# Patient Record
Sex: FEMALE | Race: WHITE | Employment: OTHER | URBAN - NONMETROPOLITAN AREA
[De-identification: names, ages, dates, MRNs, and addresses within clinical notes are randomized per-mention and may not be internally consistent; named-entity substitution may affect disease eponyms.]

---

## 2017-11-28 ENCOUNTER — APPOINTMENT (OUTPATIENT)
Dept: GENERAL RADIOLOGY | Age: 77
DRG: 536 | End: 2017-11-28
Payer: MEDICARE

## 2017-11-28 ENCOUNTER — APPOINTMENT (OUTPATIENT)
Dept: CT IMAGING | Age: 77
DRG: 536 | End: 2017-11-28
Payer: MEDICARE

## 2017-11-28 ENCOUNTER — HOSPITAL ENCOUNTER (EMERGENCY)
Age: 77
Discharge: HOME | DRG: 536 | End: 2017-11-28
Attending: FAMILY MEDICINE
Payer: MEDICARE

## 2017-11-28 VITALS
SYSTOLIC BLOOD PRESSURE: 178 MMHG | RESPIRATION RATE: 20 BRPM | DIASTOLIC BLOOD PRESSURE: 73 MMHG | HEART RATE: 82 BPM | HEIGHT: 66 IN | WEIGHT: 210 LBS | OXYGEN SATURATION: 98 % | TEMPERATURE: 98.9 F | BODY MASS INDEX: 33.75 KG/M2

## 2017-11-28 DIAGNOSIS — S70.01XA CONTUSION OF RIGHT HIP, INITIAL ENCOUNTER: Primary | ICD-10-CM

## 2017-11-28 LAB
EKG ATRIAL RATE: 73 BPM
EKG P AXIS: 42 DEGREES
EKG P-R INTERVAL: 154 MS
EKG Q-T INTERVAL: 396 MS
EKG QRS DURATION: 72 MS
EKG QTC CALCULATION (BAZETT): 436 MS
EKG R AXIS: 75 DEGREES
EKG T AXIS: 83 DEGREES
EKG VENTRICULAR RATE: 73 BPM

## 2017-11-28 PROCEDURE — 93005 ELECTROCARDIOGRAM TRACING: CPT

## 2017-11-28 PROCEDURE — 72125 CT NECK SPINE W/O DYE: CPT

## 2017-11-28 PROCEDURE — 73552 X-RAY EXAM OF FEMUR 2/>: CPT

## 2017-11-28 PROCEDURE — 6370000000 HC RX 637 (ALT 250 FOR IP): Performed by: FAMILY MEDICINE

## 2017-11-28 PROCEDURE — 72170 X-RAY EXAM OF PELVIS: CPT

## 2017-11-28 PROCEDURE — 99284 EMERGENCY DEPT VISIT MOD MDM: CPT

## 2017-11-28 PROCEDURE — 70450 CT HEAD/BRAIN W/O DYE: CPT

## 2017-11-28 RX ORDER — OXYCODONE HYDROCHLORIDE AND ACETAMINOPHEN 5; 325 MG/1; MG/1
1 TABLET ORAL EVERY 4 HOURS PRN
Qty: 10 TABLET | Refills: 0 | Status: ON HOLD | OUTPATIENT
Start: 2017-11-28 | End: 2017-12-02 | Stop reason: HOSPADM

## 2017-11-28 RX ORDER — OXYCODONE HYDROCHLORIDE AND ACETAMINOPHEN 5; 325 MG/1; MG/1
1 TABLET ORAL ONCE
Status: COMPLETED | OUTPATIENT
Start: 2017-11-28 | End: 2017-11-28

## 2017-11-28 RX ADMIN — OXYCODONE HYDROCHLORIDE AND ACETAMINOPHEN 1 TABLET: 5; 325 TABLET ORAL at 20:44

## 2017-11-28 ASSESSMENT — PAIN SCALES - GENERAL
PAINLEVEL_OUTOF10: 8
PAINLEVEL_OUTOF10: 3

## 2017-11-28 ASSESSMENT — PAIN DESCRIPTION - PROGRESSION: CLINICAL_PROGRESSION: NOT CHANGED

## 2017-11-28 ASSESSMENT — PAIN DESCRIPTION - FREQUENCY: FREQUENCY: INTERMITTENT

## 2017-11-28 ASSESSMENT — PAIN DESCRIPTION - ONSET: ONSET: ON-GOING

## 2017-11-28 ASSESSMENT — PAIN DESCRIPTION - PAIN TYPE: TYPE: ACUTE PAIN

## 2017-11-28 ASSESSMENT — PAIN DESCRIPTION - DESCRIPTORS: DESCRIPTORS: SHARP

## 2017-11-28 ASSESSMENT — PAIN DESCRIPTION - ORIENTATION: ORIENTATION: RIGHT

## 2017-11-28 ASSESSMENT — PAIN DESCRIPTION - LOCATION: LOCATION: HIP

## 2017-11-29 ENCOUNTER — APPOINTMENT (OUTPATIENT)
Dept: CT IMAGING | Age: 77
DRG: 536 | End: 2017-11-29
Payer: MEDICARE

## 2017-11-29 ENCOUNTER — HOSPITAL ENCOUNTER (INPATIENT)
Age: 77
LOS: 3 days | Discharge: NURSING, REHAB OR LONG-TERM CARE | DRG: 536 | End: 2017-12-02
Attending: FAMILY MEDICINE | Admitting: ORTHOPAEDIC SURGERY
Payer: MEDICARE

## 2017-11-29 DIAGNOSIS — S32.501A CLOSED NONDISPLACED FRACTURE OF RIGHT PUBIS, INITIAL ENCOUNTER (HCC): Primary | ICD-10-CM

## 2017-11-29 PROBLEM — S32.810B: Status: ACTIVE | Noted: 2017-11-29

## 2017-11-29 PROBLEM — S32.519B: Status: ACTIVE | Noted: 2017-11-29

## 2017-11-29 LAB
ANION GAP SERPL CALCULATED.3IONS-SCNC: 12 MEQ/L (ref 8–16)
BASOPHILS # BLD: 0.5 %
BASOPHILS ABSOLUTE: 0 THOU/MM3 (ref 0–0.1)
BUN BLDV-MCNC: 18 MG/DL (ref 7–22)
CALCIUM SERPL-MCNC: 9.1 MG/DL (ref 8.5–10.5)
CHLORIDE BLD-SCNC: 100 MEQ/L (ref 98–111)
CO2: 27 MEQ/L (ref 23–33)
CREAT SERPL-MCNC: 0.8 MG/DL (ref 0.4–1.2)
EOSINOPHIL # BLD: 2.2 %
EOSINOPHILS ABSOLUTE: 0.2 THOU/MM3 (ref 0–0.4)
GFR SERPL CREATININE-BSD FRML MDRD: 69 ML/MIN/1.73M2
GLUCOSE BLD-MCNC: 233 MG/DL (ref 70–108)
HCT VFR BLD CALC: 39.3 % (ref 37–47)
HEMOGLOBIN: 12.7 GM/DL (ref 12–16)
LYMPHOCYTES # BLD: 15.1 %
LYMPHOCYTES ABSOLUTE: 1.3 THOU/MM3 (ref 1–4.8)
MCH RBC QN AUTO: 28.6 PG (ref 27–31)
MCHC RBC AUTO-ENTMCNC: 32.4 GM/DL (ref 33–37)
MCV RBC AUTO: 88.2 FL (ref 81–99)
MONOCYTES # BLD: 5.9 %
MONOCYTES ABSOLUTE: 0.5 THOU/MM3 (ref 0.4–1.3)
NUCLEATED RED BLOOD CELLS: 0 /100 WBC
OSMOLALITY CALCULATION: 286.9 MOSMOL/KG (ref 275–300)
PDW BLD-RTO: 13.9 % (ref 11.5–14.5)
PLATELET # BLD: 197 THOU/MM3 (ref 130–400)
PMV BLD AUTO: 9.3 MCM (ref 7.4–10.4)
POTASSIUM SERPL-SCNC: 4.5 MEQ/L (ref 3.5–5.2)
RBC # BLD: 4.46 MILL/MM3 (ref 4.2–5.4)
SEG NEUTROPHILS: 76.3 %
SEGMENTED NEUTROPHILS ABSOLUTE COUNT: 6.6 THOU/MM3 (ref 1.8–7.7)
SODIUM BLD-SCNC: 139 MEQ/L (ref 135–145)
WBC # BLD: 8.6 THOU/MM3 (ref 4.8–10.8)

## 2017-11-29 PROCEDURE — 2580000003 HC RX 258: Performed by: PHYSICIAN ASSISTANT

## 2017-11-29 PROCEDURE — 85025 COMPLETE CBC W/AUTO DIFF WBC: CPT

## 2017-11-29 PROCEDURE — 2580000003 HC RX 258: Performed by: FAMILY MEDICINE

## 2017-11-29 PROCEDURE — 99285 EMERGENCY DEPT VISIT HI MDM: CPT

## 2017-11-29 PROCEDURE — 6820000001 HC L2 TRAUMA SURGERY EVALUATION

## 2017-11-29 PROCEDURE — 74176 CT ABD & PELVIS W/O CONTRAST: CPT

## 2017-11-29 PROCEDURE — 36415 COLL VENOUS BLD VENIPUNCTURE: CPT

## 2017-11-29 PROCEDURE — 1200000000 HC SEMI PRIVATE

## 2017-11-29 PROCEDURE — 6360000002 HC RX W HCPCS: Performed by: FAMILY MEDICINE

## 2017-11-29 PROCEDURE — 80048 BASIC METABOLIC PNL TOTAL CA: CPT

## 2017-11-29 PROCEDURE — 6370000000 HC RX 637 (ALT 250 FOR IP): Performed by: PHYSICIAN ASSISTANT

## 2017-11-29 RX ORDER — SODIUM CHLORIDE 9 MG/ML
INJECTION, SOLUTION INTRAVENOUS CONTINUOUS
Status: DISCONTINUED | OUTPATIENT
Start: 2017-11-29 | End: 2017-11-30

## 2017-11-29 RX ORDER — FENTANYL CITRATE 50 UG/ML
100 INJECTION, SOLUTION INTRAMUSCULAR; INTRAVENOUS
Status: DISCONTINUED | OUTPATIENT
Start: 2017-11-29 | End: 2017-11-29

## 2017-11-29 RX ORDER — 0.9 % SODIUM CHLORIDE 0.9 %
500 INTRAVENOUS SOLUTION INTRAVENOUS ONCE
Status: COMPLETED | OUTPATIENT
Start: 2017-11-29 | End: 2017-11-29

## 2017-11-29 RX ORDER — ONDANSETRON 2 MG/ML
4 INJECTION INTRAMUSCULAR; INTRAVENOUS EVERY 30 MIN PRN
Status: DISCONTINUED | OUTPATIENT
Start: 2017-11-29 | End: 2017-11-29 | Stop reason: ALTCHOICE

## 2017-11-29 RX ORDER — CYCLOBENZAPRINE HCL 10 MG
10 TABLET ORAL 3 TIMES DAILY PRN
Status: DISCONTINUED | OUTPATIENT
Start: 2017-11-29 | End: 2017-12-02 | Stop reason: HOSPADM

## 2017-11-29 RX ORDER — ONDANSETRON 2 MG/ML
4 INJECTION INTRAMUSCULAR; INTRAVENOUS EVERY 6 HOURS PRN
Status: DISCONTINUED | OUTPATIENT
Start: 2017-11-29 | End: 2017-12-02 | Stop reason: HOSPADM

## 2017-11-29 RX ORDER — ESOMEPRAZOLE MAGNESIUM 40 MG/1
40 FOR SUSPENSION ORAL DAILY
COMMUNITY
End: 2022-10-20

## 2017-11-29 RX ORDER — HYDROCODONE BITARTRATE AND ACETAMINOPHEN 5; 325 MG/1; MG/1
2 TABLET ORAL EVERY 4 HOURS PRN
Status: DISCONTINUED | OUTPATIENT
Start: 2017-11-29 | End: 2017-12-02 | Stop reason: HOSPADM

## 2017-11-29 RX ORDER — NALBUPHINE HCL 10 MG/ML
5 AMPUL (ML) INJECTION
Status: DISCONTINUED | OUTPATIENT
Start: 2017-11-29 | End: 2017-11-29 | Stop reason: ALTCHOICE

## 2017-11-29 RX ORDER — 0.9 % SODIUM CHLORIDE 0.9 %
1000 INTRAVENOUS SOLUTION INTRAVENOUS ONCE
Status: COMPLETED | OUTPATIENT
Start: 2017-11-29 | End: 2017-11-29

## 2017-11-29 RX ORDER — HYDROCODONE BITARTRATE AND ACETAMINOPHEN 5; 325 MG/1; MG/1
1 TABLET ORAL EVERY 4 HOURS PRN
Status: DISCONTINUED | OUTPATIENT
Start: 2017-11-29 | End: 2017-12-02 | Stop reason: HOSPADM

## 2017-11-29 RX ADMIN — SODIUM CHLORIDE 1000 ML: 9 INJECTION, SOLUTION INTRAVENOUS at 16:48

## 2017-11-29 RX ADMIN — CYCLOBENZAPRINE HYDROCHLORIDE 10 MG: 10 TABLET, FILM COATED ORAL at 23:15

## 2017-11-29 RX ADMIN — ONDANSETRON 4 MG: 2 INJECTION INTRAMUSCULAR; INTRAVENOUS at 16:48

## 2017-11-29 RX ADMIN — SODIUM CHLORIDE 500 ML: 9 INJECTION, SOLUTION INTRAVENOUS at 16:52

## 2017-11-29 RX ADMIN — HYDROCODONE BITARTRATE AND ACETAMINOPHEN 1 TABLET: 5; 325 TABLET ORAL at 23:15

## 2017-11-29 RX ADMIN — SODIUM CHLORIDE: 9 INJECTION, SOLUTION INTRAVENOUS at 21:31

## 2017-11-29 RX ADMIN — NALBUPHINE HYDROCHLORIDE 5 MG: 10 INJECTION, SOLUTION INTRAMUSCULAR; INTRAVENOUS; SUBCUTANEOUS at 18:37

## 2017-11-29 ASSESSMENT — ENCOUNTER SYMPTOMS
RHINORRHEA: 0
VOMITING: 0
WHEEZING: 0
ABDOMINAL PAIN: 0
SORE THROAT: 0
COUGH: 0
NAUSEA: 0
BACK PAIN: 0
EYE PAIN: 0
EYE DISCHARGE: 0
SHORTNESS OF BREATH: 0
DIARRHEA: 0

## 2017-11-29 ASSESSMENT — PAIN DESCRIPTION - PAIN TYPE
TYPE: ACUTE PAIN
TYPE: ACUTE PAIN

## 2017-11-29 ASSESSMENT — PAIN SCALES - GENERAL
PAINLEVEL_OUTOF10: 4
PAINLEVEL_OUTOF10: 3
PAINLEVEL_OUTOF10: 5
PAINLEVEL_OUTOF10: 6
PAINLEVEL_OUTOF10: 4

## 2017-11-29 NOTE — ED PROVIDER NOTES
and rash. Neurological: Negative for dizziness, syncope, weakness, light-headedness and headaches. Hematological: Negative for adenopathy. PAST MEDICAL HISTORY    has a past medical history of Cancer ([address] History of blood transfusion; Hyperlipidemia; Hypertension; and Type II or unspecified type diabetes mellitus without mention of complication, not stated as uncontrolled. SURGICAL HISTORY      has a past surgical history that includes Gallbladder surgery; Breast surgery; Cholecystectomy; Colonoscopy; Endoscopy, colon, diagnostic; eye surgery; fracture surgery; joint replacement; skin biopsy; Cosmetic surgery; and vascular surgery. CURRENT MEDICATIONS       Discharge Medication List as of 11/28/2017  8:49 PM      CONTINUE these medications which have NOT CHANGED    Details   clonazePAM (KLONOPIN) 0.5 MG tablet Take 1 tablet by mouth 3 times daily as needed for Anxiety, Disp-60 tablet, R-0      venlafaxine (EFFEXOR-XR) 75 MG XR capsule Take 1 capsule by mouth nightly, Disp-30 capsule, R-3      diphenhydrAMINE-zinc acetate 2-0.1 % cream Apply topically 3 times daily as needed. , Disp-1 Tube, R-0, Print      lidocaine (LIDODERM) 5 % Place 1 patch onto the skin daily 12 hours on, 12 hours off., Disp-30 patch, R-0      miconazole (MICOTIN) 2 % powder Apply topically 2 times daily. , Disp-45 g, R-0, Print      mometasone-formoterol (DULERA) 100-5 MCG/ACT inhaler Inhale 2 puffs into the lungs 2 times daily, Disp-1 Inhaler, R-0      aspirin 81 MG EC tablet Take 1 tablet by mouth daily, Disp-30 tablet, R-0      ibuprofen (ADVIL;MOTRIN) 200 MG tablet Take 800 mg by mouth every 4 hours as needed for Pain      metFORMIN (GLUCOPHAGE) 850 MG tablet Take by mouth 425mg (1/2 tab) in morning and 850mg in evening      metoprolol (LOPRESSOR) 50 MG tablet Take 50 mg by mouth daily      doxepin (SINEQUAN) 75 MG capsule Take 75 mg by mouth nightly      omeprazole (PRILOSEC OTC) 20 MG tablet Take 20 mg by mouth every evening      Multiple Vitamins-Minerals (THERAPEUTIC MULTIVITAMIN-MINERALS) tablet Take 1 tablet by mouth daily      Calcium Carbonate-Vitamin D (CALCIUM 600+D PO) Take 1 tablet by mouth daily      Omega-3 Fatty Acids (FISH OIL) 1200 MG CAPS Take 1 capsule by mouth daily      insulin NPH (HUMULIN N;NOVOLIN N) 100 UNIT/ML injection vial Inject 35 Units into the skin nightly      Insulin Lispro, Human, (HUMALOG PEN SC) Inject 3-10 Units into the skin every morning Doses by carb count. Ascorbic Acid (VITAMIN C PO)   Take 1,000 mg by mouth daily       losartan (COZAAR) 50 MG tablet Take 50 mg by mouth nightly       atorvastatin (LIPITOR) 20 MG tablet Take 20 mg by mouth nightly              ALLERGIES     is allergic to levofloxacin; morphine; and bactrim [sulfamethoxazole-trimethoprim]. FAMILY HISTORY     indicated that her mother is . She indicated that her father is . She indicated that her sister is . She indicated that her brother is . family history includes Cancer in her brother, father, and sister; Diabetes in her brother and sister; Heart Disease in her father and mother; Stroke in her father and mother. SOCIAL HISTORY      reports that she quit smoking about 35 years ago. Her smoking use included Cigarettes. She quit after 30.00 years of use. She has never used smokeless tobacco. She reports that she drinks about 1.2 oz of alcohol per week . She reports that she does not use drugs. PHYSICAL EXAM     INITIAL VITALS:  height is 5' 6\" (1.676 m) and weight is 210 lb (95.3 kg). Her oral temperature is 98.9 °F (37.2 °C). Her blood pressure is 178/73 (abnormal) and her pulse is 82. Her respiration is 20 and oxygen saturation is 98%. Physical Exam   Constitutional: She is oriented to person, place, and time. She appears well-developed and well-nourished. HENT:   Head: Normocephalic and atraumatic.    Right Ear: External ear normal.   Left Ear: External ear normal. proximal femur may be the result of prior bone infarct. Phleboliths are present in the pelvis. No fracture or dislocation. Final report electronically signed by Dr. Sharad Edge on 11/28/2017 8:21 PM    Ct Head Wo Contrast    Result Date: 11/28/2017  PROCEDURE: CT HEAD WO CONTRAST CLINICAL INFORMATION: Pain following a fall TECHNIQUE: CT scan of the head was performed from the vertex to the skull base without use of intravenous contrast. All CT scans at this facility use dose modulation, iterative reconstruction, and/or weight-based dosing when appropriate to reduce radiation dose to as low as reasonably achievable. COMPARISON: CT head 6/27/2016 FINDINGS: There is no mass effect, midline shift or acute hemorrhage. Ventricles and sulci are prominent. Diffuse periventricular white matter hypoattenuation is again noted. Cerebral vascular calcifications are present. Visualized orbits, paranasal sinuses and mastoid air cells are unremarkable. 1. No mass effect or acute hemorrhage. 2. Chronic periventricular small vessel ischemic changes and cerebral atrophy. **This report has been created using voice recognition software. It may contain minor errors which are inherent in voice recognition technology. ** Final report electronically signed by Dr. Sharad Edge on 11/28/2017 8:28 PM    Ct Cervical Spine Wo Contrast    Result Date: 11/28/2017  PROCEDURE: CT CERVICAL SPINE WO CONTRAST CLINICAL INFORMATION: Pain following a fall TECHNIQUE: CT of the cervical spine was performed without use of intravenous contrast. Axial images as well as coronal and sagittal reconstructions were obtained. All CT scans at this facility use dose modulation, iterative reconstruction, and/or weight-based dosing when appropriate to reduce radiation dose to as low as reasonably achievable. COMPARISON: CT cervical spine 6/27/2016 FINDINGS: Slight straightening of normal lower cervical lordosis is likely secondary to patient positioning. List as of 11/28/2017  8:49 PM      START taking these medications    Details   oxyCODONE-acetaminophen (PERCOCET) 5-325 MG per tablet Take 1 tablet by mouth every 4 hours as needed for Pain ., Disp-10 tablet, R-0Print             (Please note that portions of this note were completed with a voice recognition program.  Efforts were made to edit the dictations but occasionally words are mis-transcribed.)    Scribe:  Marino Guillen 11/28/17 7:15 PM Scribing for and in the presence of Guerline Gonzalez MD.    Signed by: Marino Guillen (Name), Scribe, 12/06/17 9:58 AM    Provider:  I personally performed the services described in the documentation, reviewed and edited the documentation which was dictated to the scribe in my presence, and it accurately records my words and actions.     Guerline Gonzalez MD 11/28/17 9:58 AM                          Guerline Gonzalez MD  11/28/17 [id number]       Guerline Gonzalez MD  11/29/17 [id number] [facility] Puma Harry MD  12/06/17 3497

## 2017-11-29 NOTE — ED NOTES
Complete pancho care provided w/2 RN assist. Pt able to log roll to either side w/some discomfort. Pt repositioned on cot. Placed in gown. Call light provided.       Andie CLOVER Cohn  11/29/17 3062

## 2017-11-29 NOTE — Clinical Note
Patient Class: Inpatient [101]   REQUIRED: Diagnosis: Fracture of superior rim of unspecified pubis, initial encounter for open fracture [facility] [[id number]]   Estimated Length of Stay: Estimated stay of more than 2 midnights   Future Attending Provider: Bradly Rivers [[id number]]   Admitting Provider: Bradly Rivers [[id number]]   Telemetry Bed Required?: No

## 2017-11-29 NOTE — CARE COORDINATION
Brief ED Social Work Assessment  11/29/17, 4:19 PM    Spoke with patient as requested by the MD due to possible ECF placement. Spoke with patient and she is hopeful that they will admit her. Patient did stated that she has been at [facility] [address] in the past and that she would consider returning there and it was okay to call to see if they have a bed available. Spoke with Robe at [facility] [address] and they have no open beds.

## 2017-11-29 NOTE — ED PROVIDER NOTES
neck pain. Skin: Negative for pallor and rash. Neurological: Negative for dizziness, syncope, weakness, light-headedness and headaches. Hematological: Negative for adenopathy. Psychiatric/Behavioral: Negative for confusion, dysphoric mood and suicidal ideas. The patient is not nervous/anxious. PAST MEDICAL HISTORY    has a past medical history of Cancer ([facility] [address] History of blood transfusion; Hyperlipidemia; Hypertension; and Type II or unspecified type diabetes mellitus without mention of complication, not stated as uncontrolled. SURGICAL HISTORY      has a past surgical history that includes Gallbladder surgery; Breast surgery; Cholecystectomy; Colonoscopy; Endoscopy, colon, diagnostic; eye surgery; fracture surgery; joint replacement; skin biopsy; Cosmetic surgery; and vascular surgery. CURRENT MEDICATIONS       Previous Medications    ASCORBIC ACID (VITAMIN C PO)      Take 1,000 mg by mouth daily     ASPIRIN 81 MG EC TABLET    Take 1 tablet by mouth daily    ATORVASTATIN (LIPITOR) 20 MG TABLET    Take 20 mg by mouth nightly     CALCIUM CARBONATE-VITAMIN D (CALCIUM 600+D PO)    Take 1 tablet by mouth daily    CLONAZEPAM (KLONOPIN) 0.5 MG TABLET    Take 1 tablet by mouth 3 times daily as needed for Anxiety    DIPHENHYDRAMINE-ZINC ACETATE 2-0.1 % CREAM    Apply topically 3 times daily as needed. DOXEPIN (SINEQUAN) 75 MG CAPSULE    Take 75 mg by mouth nightly    IBUPROFEN (ADVIL;MOTRIN) 200 MG TABLET    Take 800 mg by mouth every 4 hours as needed for Pain    INSULIN LISPRO, HUMAN, (HUMALOG PEN SC)    Inject 3-10 Units into the skin every morning Doses by carb count. INSULIN NPH (HUMULIN N;NOVOLIN N) 100 UNIT/ML INJECTION VIAL    Inject 35 Units into the skin nightly    LIDOCAINE (LIDODERM) 5 %    Place 1 patch onto the skin daily 12 hours on, 12 hours off.     LOSARTAN (COZAAR) 50 MG TABLET    Take 50 mg by mouth nightly     METFORMIN (GLUCOPHAGE) 850 MG TABLET    Take by mouth 425mg (1/2 tab) in morning and 850mg in evening    METOPROLOL (LOPRESSOR) 50 MG TABLET    Take 50 mg by mouth daily    MICONAZOLE (MICOTIN) 2 % POWDER    Apply topically 2 times daily. MOMETASONE-FORMOTEROL (DULERA) 100-5 MCG/ACT INHALER    Inhale 2 puffs into the lungs 2 times daily    MULTIPLE VITAMINS-MINERALS (THERAPEUTIC MULTIVITAMIN-MINERALS) TABLET    Take 1 tablet by mouth daily    OMEGA-3 FATTY ACIDS (FISH OIL) 1200 MG CAPS    Take 1 capsule by mouth daily    OMEPRAZOLE (PRILOSEC OTC) 20 MG TABLET    Take 20 mg by mouth every evening    OXYCODONE-ACETAMINOPHEN (PERCOCET) 5-325 MG PER TABLET    Take 1 tablet by mouth every 4 hours as needed for Pain . VENLAFAXINE (EFFEXOR-XR) 75 MG XR CAPSULE    Take 1 capsule by mouth nightly       ALLERGIES     is allergic to levofloxacin; morphine; and bactrim [sulfamethoxazole-trimethoprim]. FAMILY HISTORY     indicated that her mother is . She indicated that her father is . family history includes Heart Disease in her father and mother; Stroke in her father and mother. SOCIAL HISTORY      reports that she quit smoking about 35 years ago. Her smoking use included Cigarettes. She quit after 30.00 years of use. She has never used smokeless tobacco. She reports that she drinks about 1.2 oz of alcohol per week . She reports that she does not use drugs. PHYSICAL EXAM     INITIAL VITALS:  blood pressure is 179/80 (abnormal) and her pulse is 43 (abnormal). Her respiration is 18 and oxygen saturation is 95%. Physical Exam   Constitutional: She is oriented to person, place, and time. She appears well-developed and well-nourished. HENT:   Head: Normocephalic and atraumatic. Right Ear: External ear normal.   Left Ear: External ear normal.   Eyes: Conjunctivae are normal. Right eye exhibits no discharge. Left eye exhibits no discharge. No scleral icterus. Neck: Normal range of motion. Neck supple. No JVD present.    Cardiovascular: Normal rate, regular

## 2017-11-30 LAB
GLUCOSE BLD-MCNC: 156 MG/DL (ref 70–108)
GLUCOSE BLD-MCNC: 169 MG/DL (ref 70–108)
GLUCOSE BLD-MCNC: 178 MG/DL (ref 70–108)
GLUCOSE BLD-MCNC: 219 MG/DL (ref 70–108)
GLUCOSE BLD-MCNC: 228 MG/DL (ref 70–108)

## 2017-11-30 PROCEDURE — 6370000000 HC RX 637 (ALT 250 FOR IP): Performed by: INTERNAL MEDICINE

## 2017-11-30 PROCEDURE — G8979 MOBILITY GOAL STATUS: HCPCS

## 2017-11-30 PROCEDURE — G0008 ADMIN INFLUENZA VIRUS VAC: HCPCS | Performed by: ORTHOPAEDIC SURGERY

## 2017-11-30 PROCEDURE — 97110 THERAPEUTIC EXERCISES: CPT

## 2017-11-30 PROCEDURE — 1200000000 HC SEMI PRIVATE

## 2017-11-30 PROCEDURE — 6370000000 HC RX 637 (ALT 250 FOR IP): Performed by: PHYSICIAN ASSISTANT

## 2017-11-30 PROCEDURE — 6360000002 HC RX W HCPCS: Performed by: PHYSICIAN ASSISTANT

## 2017-11-30 PROCEDURE — 99232 SBSQ HOSP IP/OBS MODERATE 35: CPT | Performed by: INTERNAL MEDICINE

## 2017-11-30 PROCEDURE — G8978 MOBILITY CURRENT STATUS: HCPCS

## 2017-11-30 PROCEDURE — 82948 REAGENT STRIP/BLOOD GLUCOSE: CPT

## 2017-11-30 PROCEDURE — 90686 IIV4 VACC NO PRSV 0.5 ML IM: CPT | Performed by: ORTHOPAEDIC SURGERY

## 2017-11-30 PROCEDURE — 97162 PT EVAL MOD COMPLEX 30 MIN: CPT

## 2017-11-30 PROCEDURE — 6370000000 HC RX 637 (ALT 250 FOR IP): Performed by: ORTHOPAEDIC SURGERY

## 2017-11-30 PROCEDURE — 6360000002 HC RX W HCPCS: Performed by: INTERNAL MEDICINE

## 2017-11-30 PROCEDURE — 6360000002 HC RX W HCPCS: Performed by: ORTHOPAEDIC SURGERY

## 2017-11-30 RX ORDER — VENLAFAXINE HYDROCHLORIDE 75 MG/1
75 CAPSULE, EXTENDED RELEASE ORAL NIGHTLY
Status: DISCONTINUED | OUTPATIENT
Start: 2017-11-30 | End: 2017-12-02 | Stop reason: HOSPADM

## 2017-11-30 RX ORDER — ESOMEPRAZOLE MAGNESIUM 40 MG/1
40 FOR SUSPENSION ORAL DAILY
Status: DISCONTINUED | OUTPATIENT
Start: 2017-11-30 | End: 2017-11-30 | Stop reason: CLARIF

## 2017-11-30 RX ORDER — LOSARTAN POTASSIUM 50 MG/1
50 TABLET ORAL NIGHTLY
Status: DISCONTINUED | OUTPATIENT
Start: 2017-11-30 | End: 2017-12-02 | Stop reason: HOSPADM

## 2017-11-30 RX ORDER — M-VIT,TX,IRON,MINS/CALC/FOLIC 27MG-0.4MG
1 TABLET ORAL DAILY
Status: DISCONTINUED | OUTPATIENT
Start: 2017-11-30 | End: 2017-12-02 | Stop reason: HOSPADM

## 2017-11-30 RX ORDER — METOPROLOL TARTRATE 50 MG/1
50 TABLET, FILM COATED ORAL DAILY
Status: DISCONTINUED | OUTPATIENT
Start: 2017-11-30 | End: 2017-12-02 | Stop reason: HOSPADM

## 2017-11-30 RX ORDER — DEXTROSE MONOHYDRATE 50 MG/ML
100 INJECTION, SOLUTION INTRAVENOUS PRN
Status: DISCONTINUED | OUTPATIENT
Start: 2017-11-30 | End: 2017-12-02 | Stop reason: HOSPADM

## 2017-11-30 RX ORDER — ASCORBIC ACID 500 MG
1000 TABLET ORAL DAILY
Status: DISCONTINUED | OUTPATIENT
Start: 2017-11-30 | End: 2017-12-02 | Stop reason: HOSPADM

## 2017-11-30 RX ORDER — POLYETHYLENE GLYCOL 3350 17 G/17G
17 POWDER, FOR SOLUTION ORAL DAILY
Status: DISCONTINUED | OUTPATIENT
Start: 2017-11-30 | End: 2017-12-02 | Stop reason: HOSPADM

## 2017-11-30 RX ORDER — NICOTINE POLACRILEX 4 MG
15 LOZENGE BUCCAL PRN
Status: DISCONTINUED | OUTPATIENT
Start: 2017-11-30 | End: 2017-12-02 | Stop reason: HOSPADM

## 2017-11-30 RX ORDER — ATORVASTATIN CALCIUM 20 MG/1
20 TABLET, FILM COATED ORAL NIGHTLY
Status: DISCONTINUED | OUTPATIENT
Start: 2017-11-30 | End: 2017-12-02 | Stop reason: HOSPADM

## 2017-11-30 RX ORDER — PANTOPRAZOLE SODIUM 40 MG/1
40 TABLET, DELAYED RELEASE ORAL
Status: DISCONTINUED | OUTPATIENT
Start: 2017-11-30 | End: 2017-12-02 | Stop reason: HOSPADM

## 2017-11-30 RX ORDER — OYSTER SHELL CALCIUM WITH VITAMIN D 500; 200 MG/1; [IU]/1
1 TABLET, FILM COATED ORAL DAILY
Status: DISCONTINUED | OUTPATIENT
Start: 2017-11-30 | End: 2017-12-02 | Stop reason: HOSPADM

## 2017-11-30 RX ORDER — DEXTROSE MONOHYDRATE 25 G/50ML
12.5 INJECTION, SOLUTION INTRAVENOUS PRN
Status: DISCONTINUED | OUTPATIENT
Start: 2017-11-30 | End: 2017-12-02 | Stop reason: HOSPADM

## 2017-11-30 RX ADMIN — METOPROLOL TARTRATE 50 MG: 50 TABLET, FILM COATED ORAL at 20:32

## 2017-11-30 RX ADMIN — HYDROMORPHONE HYDROCHLORIDE 0.5 MG: 1 INJECTION, SOLUTION INTRAMUSCULAR; INTRAVENOUS; SUBCUTANEOUS at 01:43

## 2017-11-30 RX ADMIN — INFLUENZA A VIRUS A/SINGAPORE/GP1908/2015 IVR-180A (H1N1) ANTIGEN (PROPIOLACTONE INACTIVATED), INFLUENZA A VIRUS A/HONG KONG/4801/2014 X-263B (H3N2) ANTIGEN (PROPIOLACTONE INACTIVATED), INFLUENZA B VIRUS B/BRISBANE/46/2015 ANTIGEN (PROPIOLACTONE INACTIVATED), AND INFLUENZA B VIRUS B/PHUKET/3073/2013 BVR-1B ANTIGEN (PROPIOLACTONE INACTIVATED) 0.5 ML: 15; 15; 15; 15 INJECTION, SUSPENSION INTRAMUSCULAR at 10:24

## 2017-11-30 RX ADMIN — VENLAFAXINE HYDROCHLORIDE 75 MG: 75 CAPSULE, EXTENDED RELEASE ORAL at 02:04

## 2017-11-30 RX ADMIN — HYDROCODONE BITARTRATE AND ACETAMINOPHEN 2 TABLET: 5; 325 TABLET ORAL at 10:16

## 2017-11-30 RX ADMIN — ATORVASTATIN CALCIUM 20 MG: 20 TABLET, FILM COATED ORAL at 20:32

## 2017-11-30 RX ADMIN — LOSARTAN POTASSIUM 50 MG: 50 TABLET, FILM COATED ORAL at 20:32

## 2017-11-30 RX ADMIN — VENLAFAXINE HYDROCHLORIDE 75 MG: 75 CAPSULE, EXTENDED RELEASE ORAL at 20:32

## 2017-11-30 RX ADMIN — MULTIPLE VITAMINS W/ MINERALS TAB 1 TABLET: TAB at 10:15

## 2017-11-30 RX ADMIN — HYDROCODONE BITARTRATE AND ACETAMINOPHEN 2 TABLET: 5; 325 TABLET ORAL at 19:58

## 2017-11-30 RX ADMIN — METFORMIN HYDROCHLORIDE 850 MG: 850 TABLET, FILM COATED ORAL at 20:32

## 2017-11-30 RX ADMIN — CYCLOBENZAPRINE HYDROCHLORIDE 10 MG: 10 TABLET, FILM COATED ORAL at 10:16

## 2017-11-30 RX ADMIN — DOXEPIN HYDROCHLORIDE 75 MG: 50 CAPSULE ORAL at 02:04

## 2017-11-30 RX ADMIN — DOXEPIN HYDROCHLORIDE 75 MG: 50 CAPSULE ORAL at 22:32

## 2017-11-30 RX ADMIN — Medication 3 UNITS: at 10:25

## 2017-11-30 RX ADMIN — HYDROCODONE BITARTRATE AND ACETAMINOPHEN 1 TABLET: 5; 325 TABLET ORAL at 03:29

## 2017-11-30 RX ADMIN — METFORMIN HYDROCHLORIDE 425 MG: 850 TABLET, FILM COATED ORAL at 10:16

## 2017-11-30 RX ADMIN — POLYETHYLENE GLYCOL 3350 17 G: 17 POWDER, FOR SOLUTION ORAL at 20:32

## 2017-11-30 RX ADMIN — Medication 1000 MG: at 10:15

## 2017-11-30 RX ADMIN — INSULIN HUMAN 35 UNITS: 100 INJECTION, SUSPENSION SUBCUTANEOUS at 20:38

## 2017-11-30 RX ADMIN — INSULIN LISPRO 1 UNITS: 100 INJECTION, SOLUTION INTRAVENOUS; SUBCUTANEOUS at 20:38

## 2017-11-30 RX ADMIN — CYCLOBENZAPRINE HYDROCHLORIDE 10 MG: 10 TABLET, FILM COATED ORAL at 19:58

## 2017-11-30 RX ADMIN — CALCIUM CARBONATE-VITAMIN D TAB 500 MG-200 UNIT 1 TABLET: 500-200 TAB at 10:16

## 2017-11-30 RX ADMIN — HYDROCODONE BITARTRATE AND ACETAMINOPHEN 2 TABLET: 5; 325 TABLET ORAL at 14:23

## 2017-11-30 RX ADMIN — ENOXAPARIN SODIUM 40 MG: 40 INJECTION SUBCUTANEOUS at 20:31

## 2017-11-30 RX ADMIN — PANTOPRAZOLE SODIUM 40 MG: 40 TABLET, DELAYED RELEASE ORAL at 06:31

## 2017-11-30 ASSESSMENT — PAIN SCALES - GENERAL
PAINLEVEL_OUTOF10: 6
PAINLEVEL_OUTOF10: 8
PAINLEVEL_OUTOF10: 3
PAINLEVEL_OUTOF10: 7
PAINLEVEL_OUTOF10: 4
PAINLEVEL_OUTOF10: 7
PAINLEVEL_OUTOF10: 8
PAINLEVEL_OUTOF10: 7
PAINLEVEL_OUTOF10: 6
PAINLEVEL_OUTOF10: 4
PAINLEVEL_OUTOF10: 7

## 2017-11-30 ASSESSMENT — PAIN DESCRIPTION - PAIN TYPE
TYPE: ACUTE PAIN

## 2017-11-30 ASSESSMENT — PAIN DESCRIPTION - ORIENTATION
ORIENTATION: RIGHT

## 2017-11-30 ASSESSMENT — PAIN DESCRIPTION - ONSET: ONSET: ON-GOING

## 2017-11-30 ASSESSMENT — PAIN DESCRIPTION - LOCATION
LOCATION: HIP;PELVIS
LOCATION: PELVIS

## 2017-11-30 ASSESSMENT — PAIN DESCRIPTION - PROGRESSION: CLINICAL_PROGRESSION: GRADUALLY IMPROVING

## 2017-11-30 ASSESSMENT — PAIN DESCRIPTION - DESCRIPTORS: DESCRIPTORS: ACHING;DULL

## 2017-11-30 ASSESSMENT — PAIN DESCRIPTION - FREQUENCY: FREQUENCY: INTERMITTENT

## 2017-11-30 NOTE — CARE COORDINATION
11/30/17, 3:02 PM    DISCHARGE BARRIERS      [facility] with [facility], no beds are available. Spoke with patient and her . Their second choice is [facility]. Referral made to [facility] for review.

## 2017-11-30 NOTE — H&P
mg at 11/29/17 5507     Current Outpatient Prescriptions   Medication Sig Dispense Refill    oxyCODONE-acetaminophen (PERCOCET) 5-325 MG per tablet Take 1 tablet by mouth every 4 hours as needed for Pain . 10 tablet 0    clonazePAM (KLONOPIN) 0.5 MG tablet Take 1 tablet by mouth 3 times daily as needed for Anxiety 60 tablet 0    venlafaxine (EFFEXOR-XR) 75 MG XR capsule Take 1 capsule by mouth nightly 30 capsule 3    diphenhydrAMINE-zinc acetate 2-0.1 % cream Apply topically 3 times daily as needed. 1 Tube 0    lidocaine (LIDODERM) 5 % Place 1 patch onto the skin daily 12 hours on, 12 hours off. 30 patch 0    miconazole (MICOTIN) 2 % powder Apply topically 2 times daily. 45 g 0    mometasone-formoterol (DULERA) 100-5 MCG/ACT inhaler Inhale 2 puffs into the lungs 2 times daily 1 Inhaler 0    aspirin 81 MG EC tablet Take 1 tablet by mouth daily 30 tablet 0    ibuprofen (ADVIL;MOTRIN) 200 MG tablet Take 800 mg by mouth every 4 hours as needed for Pain      metFORMIN (GLUCOPHAGE) 850 MG tablet Take by mouth 425mg (1/2 tab) in morning and 850mg in evening      metoprolol (LOPRESSOR) 50 MG tablet Take 50 mg by mouth daily      doxepin (SINEQUAN) 75 MG capsule Take 75 mg by mouth nightly      omeprazole (PRILOSEC OTC) 20 MG tablet Take 20 mg by mouth every evening      Multiple Vitamins-Minerals (THERAPEUTIC MULTIVITAMIN-MINERALS) tablet Take 1 tablet by mouth daily      Calcium Carbonate-Vitamin D (CALCIUM 600+D PO) Take 1 tablet by mouth daily      Omega-3 Fatty Acids (FISH OIL) 1200 MG CAPS Take 1 capsule by mouth daily      insulin NPH (HUMULIN N;NOVOLIN N) 100 UNIT/ML injection vial Inject 35 Units into the skin nightly      Insulin Lispro, Human, (HUMALOG PEN SC) Inject 3-10 Units into the skin every morning Doses by carb count.       Ascorbic Acid (VITAMIN C PO)   Take 1,000 mg by mouth daily       losartan (COZAAR) 50 MG tablet Take 50 mg by mouth nightly       atorvastatin (LIPITOR) 20 MG

## 2017-11-30 NOTE — PROGRESS NOTES
Daily with breakfast  metFORMIN (GLUCOPHAGE) tablet 850 mg, 850 mg, Oral, Nightly  glucose (GLUTOSE) 40 % oral gel 15 g, 15 g, Oral, PRN  dextrose 50 % solution 12.5 g, 12.5 g, Intravenous, PRN  glucagon (rDNA) injection 1 mg, 1 mg, Intramuscular, PRN  dextrose 5 % solution, 100 mL/hr, Intravenous, PRN  pantoprazole (PROTONIX) tablet 40 mg, 40 mg, Oral, QAM AC  insulin lispro (HUMALOG) injection vial 3-10 Units, 3-10 Units, Subcutaneous, QAM AC  MDI Treatment, , , Q4H PRN **AND** mometasone-formoterol (DULERA) 100-5 MCG/ACT inhaler 2 puff, 2 puff, Inhalation, BID  0.9 % sodium chloride infusion, , Intravenous, Continuous  HYDROmorphone (DILAUDID) injection 0.5 mg, 0.5 mg, Intravenous, Q2H PRN  HYDROcodone-acetaminophen (NORCO) 5-325 MG per tablet 1 tablet, 1 tablet, Oral, Q4H PRN  HYDROcodone-acetaminophen (NORCO) 5-325 MG per tablet 2 tablet, 2 tablet, Oral, Q4H PRN  ondansetron (ZOFRAN) injection 4 mg, 4 mg, Intravenous, Q6H PRN  cyclobenzaprine (FLEXERIL) tablet 10 mg, 10 mg, Oral, TID PRN        PLAN    Up with PT, WBAT with walker, crystal likely need ECF placement

## 2017-11-30 NOTE — ED NOTES
Patient transported to [phone]  by cart in stable condition. IV infusing and line is patent.         Daniel Hernandez, EMT-P  11/29/17 1985
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
## 2017-11-30 NOTE — PLAN OF CARE
Problem: Falls - Risk of  Goal: Absence of falls  Outcome: Ongoing  PT USES CALL LIGHT APPROPRIATELY. NO FALLS THIS SHIFT. Problem: Pain:  Goal: Pain level will decrease  Pain level will decrease   Outcome: Ongoing  PT COMPLAINING OF PAIN OF 4-7/10 THIS SHIFT. PRN PAIN MEDICATION GIVEN WHEN AVAILABLE. Problem: Risk for Impaired Skin Integrity  Goal: Tissue integrity - skin and mucous membranes  Structural intactness and normal physiological function of skin and  mucous membranes. Outcome: Ongoing  SMALL ABRASIONS NOTED TO RIGHT WRIST AND ELBOW. NO OTHER SKIN ISSUES NOTED THIS SHIFT. Problem: Cardiovascular  Goal: No DVT, peripheral vascular complications  Outcome: Ongoing  NO SIGNS/SYMPTOMS OF DVT THIS SHIFT. Problem: Respiratory  Goal: O2 Sat > 90%  Outcome: Ongoing  02 SAT > 90% THIS SHIFT ON ROOM AIR. Problem: GI  Goal: No bowel complications  Outcome: Ongoing  NO BM THIS SHIFT. BOWEL SOUNDS HYPOACTIVE. ABDOMEN SOFT. PT PASSING GAS. Problem:   Goal: Adequate urinary output  Outcome: Ongoing  URINARY OUTPUT ADEQUATE THIS SHIFT. Comments: Care plan reviewed with patient. Patient verbalizes understanding of the plan of care and contribute to goal setting.

## 2017-11-30 NOTE — CARE COORDINATION
11/30/17, 10:22 AM      Xavi Raygoza       Admitted from: ED 11/29/2017/ 1528 Hospital day: 1   [facility] Reason for admit: Fracture of superior rim of unspecified pubis, initial encounter for open fracture ([facility]) [S32.519B]  Pelvic ring fracture, open, initial encounter ([facility] [address] [S32.810B] Status: inpatient  Admit order signed?: yes  PMH:  has a past medical history of Cancer ([facility] History of blood transfusion; Hyperlipidemia; Hypertension; and Type II or unspecified type diabetes mellitus without mention of complication, not stated as uncontrolled. Procedure: no  Pertinent abnormal Imaging:CT scan  1. No acute intra-abdominal or intrapelvic findings. 2. Uncomplicated sigmoid colon diverticulosis. 3. Nondisplaced fractures of the right superior and inferior pubic rami and pubic symphysis. Medications:  Scheduled Meds:   vitamin C  1,000 mg Oral Daily    atorvastatin  20 mg Oral Nightly    calcium-vitamin D  1 tablet Oral Daily    doxepin  75 mg Oral Nightly    losartan  50 mg Oral Nightly    metoprolol tartrate  50 mg Oral Daily    miconazole   Topical BID    therapeutic multivitamin-minerals  1 tablet Oral Daily    venlafaxine  75 mg Oral Nightly    insulin NPH  35 Units Subcutaneous Nightly    metFORMIN  425 mg Oral Daily with breakfast    metFORMIN  850 mg Oral Nightly    pantoprazole  40 mg Oral QAM AC    insulin lispro  3-10 Units Subcutaneous QAM AC    influenza virus vaccine  0.5 mL Intramuscular Once     Continuous Infusions:   dextrose      sodium chloride 100 mL/hr at 11/29/17 2131      Pertinent Info/Orders/Treatment Plan:  PT/OT. Pain management. N/V checks. SW for ECF  Diet: DIET CARB CONTROL;   DVT Prophylaxis: SCD's ordered  Smoking status:  reports that she quit smoking about 35 years ago. Her smoking use included Cigarettes. She quit after 30.00 years of use.  She has never used smokeless tobacco.   Influenza Vaccination Screening Completed: yes  Pneumonia

## 2017-11-30 NOTE — CONSULTS
7/6/16  Yes Mayra aMhajan MD   aspirin 81 MG EC tablet Take 1 tablet by mouth daily 7/6/16  Yes Mayra Mahajan MD   ibuprofen (ADVIL;MOTRIN) 200 MG tablet Take 800 mg by mouth every 4 hours as needed for Pain   Yes Historical Provider, MD   metFORMIN (GLUCOPHAGE) 850 MG tablet Take by mouth 425mg (1/2 tab) in morning and 850mg in evening   Yes Historical Provider, MD   metoprolol (LOPRESSOR) 50 MG tablet Take 50 mg by mouth daily   Yes Historical Provider, MD   doxepin (SINEQUAN) 75 MG capsule Take 75 mg by mouth nightly   Yes Historical Provider, MD   Multiple Vitamins-Minerals (THERAPEUTIC MULTIVITAMIN-MINERALS) tablet Take 1 tablet by mouth daily   Yes Historical Provider, MD   Calcium Carbonate-Vitamin D (CALCIUM 600+D PO) Take 1 tablet by mouth daily   Yes Historical Provider, MD   Omega-3 Fatty Acids (FISH OIL) 1200 MG CAPS Take 1 capsule by mouth daily   Yes Historical Provider, MD   insulin NPH (HUMULIN N;NOVOLIN N) 100 UNIT/ML injection vial Inject 35 Units into the skin nightly   Yes Historical Provider, MD   Insulin Lispro, Human, (HUMALOG PEN SC) Inject 3-10 Units into the skin every morning Doses by carb count. Yes Historical Provider, MD   Ascorbic Acid (VITAMIN C PO)   Take 1,000 mg by mouth daily    Yes Historical Provider, MD   losartan (COZAAR) 50 MG tablet Take 50 mg by mouth nightly    Yes Historical Provider, MD   atorvastatin (LIPITOR) 20 MG tablet Take 20 mg by mouth nightly    Yes Historical Provider, MD   clonazePAM (KLONOPIN) 0.5 MG tablet Take 1 tablet by mouth 3 times daily as needed for Anxiety 7/6/16   Mayra Mahajan MD   diphenhydrAMINE-zinc acetate 2-0.1 % cream Apply topically 3 times daily as needed. 7/6/16   Mayra Mahajan MD   lidocaine (LIDODERM) 5 % Place 1 patch onto the skin daily 12 hours on, 12 hours off. 7/6/16   Mayra Mahajan MD   miconazole (MICOTIN) 2 % powder Apply topically 2 times daily.  7/6/16   Mayra Mahajan MD   mometasone-formoterol [facility]) 100-5 MCG/ACT inhaler Inhale 2 puffs into the lungs 2 times daily 7/6/16   Gigi Snider MD   omeprazole (PRILOSEC OTC) 20 MG tablet Take 20 mg by mouth every evening    Historical Provider, MD       Allergies:  Levofloxacin; Morphine; and Bactrim [sulfamethoxazole-trimethoprim]    Social History:      The patient currently lives with     TOBACCO:   reports that she quit smoking about 35 years ago. Her smoking use included Cigarettes. She quit after 30.00 years of use. She has never used smokeless tobacco.  ETOH:   reports that she drinks about 1.2 oz of alcohol per week . Family History:      Positive as follows:        Problem Relation Age of Onset    Heart Disease Mother     Stroke Mother     Heart Disease Father     Stroke Father     Cancer Father     Cancer Sister     Diabetes Sister     Cancer Brother     Diabetes Brother        Diet:  DIET CARB CONTROL;    REVIEW OF SYSTEMS:   Pertinent positives as noted in the HPI. All other systems reviewed and negative. PHYSICAL EXAM:  /89   Pulse 111   Temp 99.1 °F (37.3 °C) (Oral)   Resp 16   Ht 5' 6\" (1.676 m)   Wt 214 lb 8 oz (97.3 kg)   LMP  (LMP Unknown)   SpO2 90%   BMI 34.62 kg/m²   General appearance: No apparent distress, appears stated age and cooperative. HEENT: Normal cephalic, atraumatic without obvious deformity. Pupils equal, round, and reactive to light. Extra ocular muscles intact. Conjunctivae/corneas clear. Neck: Supple, with full range of motion. No jugular venous distention. Trachea midline. Respiratory:  Normal respiratory effort. Clear to auscultation, bilaterally without Rales/Wheezes/Rhonchi. Cardiovascular: Regular rate and rhythm with normal S1/S2 without murmurs, rubs or gallops. Abdomen: Soft, non-tender, non-distended with normal bowel sounds. Musculoskeletal: painful pelvis  Skin: Skin color, texture, turgor normal.  No rashes or lesions. Neurologic:  Neurovascularly intact without any focal sensory/motor deficits. Cranial nerves: II-XII intact, grossly non-focal.  Psychiatric: Alert and oriented, thought content appropriate, normal insight  Capillary Refill: Brisk,< 3 seconds   Peripheral Pulses: +2 palpable, equal bilaterally     Labs:     Recent Labs      11/29/17   1625   WBC  8.6   HGB  12.7   HCT  39.3   PLT  197     Recent Labs      11/29/17   1625   NA  139   K  4.5   CL  100   CO2  27   BUN  18   CREATININE  0.8   CALCIUM  9.1     No results for input(s): AST, ALT, BILIDIR, BILITOT, ALKPHOS in the last 72 hours. No results for input(s): INR in the last 72 hours. No results for input(s): Ashtyn Rebollar in the last 72 hours. Urinalysis:    Lab Results   Component Value Date    NITRU NEGATIVE 06/27/2016    WBCUA 15-25 06/27/2016    WBCUA NONE SEEN 04/15/2012    BACTERIA NONE 06/27/2016    RBCUA 0-2 06/27/2016    BLOODU NEGATIVE 06/27/2016    SPECGRAV 1.017 06/27/2016    GLUCOSEU 100 04/15/2012       Radiology: I have reviewed the radiology reports with the following interpretation:     CT ABDOMEN PELVIS WO IV CONTRAST Additional Contrast? None   Final Result   1. No acute intra-abdominal or intrapelvic findings. 2. Uncomplicated sigmoid colon diverticulosis. 3. Nondisplaced fractures of the right superior and inferior pubic rami and pubic symphysis.       Final report electronically signed by Dr. Iris Mc on 11/29/2017 6:22 PM             EKG:  I have reviewed the EKG with the following interpretation:    Sinus rhythm, no acute change    DVT prophylaxis: [x] Lovenox                                 [x] SCDs                                 [] SQ Heparin                                 [] Encourage ambulation           [] Already on Anticoagulation      Code Status: Prior    PT/OT Eval Status: Active and ongoing      ASSESSMENT:    Active Hospital Problems    Diagnosis Date Noted    Essential hypertension [I10]      Priority: Medium    Fracture of superior rim of unspecified pubis, initial encounter for

## 2017-12-01 LAB
GLUCOSE BLD-MCNC: 173 MG/DL (ref 70–108)
GLUCOSE BLD-MCNC: 182 MG/DL (ref 70–108)
GLUCOSE BLD-MCNC: 80 MG/DL (ref 70–108)

## 2017-12-01 PROCEDURE — 6370000000 HC RX 637 (ALT 250 FOR IP): Performed by: INTERNAL MEDICINE

## 2017-12-01 PROCEDURE — 2500000003 HC RX 250 WO HCPCS: Performed by: INTERNAL MEDICINE

## 2017-12-01 PROCEDURE — G8987 SELF CARE CURRENT STATUS: HCPCS

## 2017-12-01 PROCEDURE — 94640 AIRWAY INHALATION TREATMENT: CPT

## 2017-12-01 PROCEDURE — 97166 OT EVAL MOD COMPLEX 45 MIN: CPT

## 2017-12-01 PROCEDURE — 97110 THERAPEUTIC EXERCISES: CPT

## 2017-12-01 PROCEDURE — 6370000000 HC RX 637 (ALT 250 FOR IP): Performed by: PHYSICIAN ASSISTANT

## 2017-12-01 PROCEDURE — 1200000000 HC SEMI PRIVATE

## 2017-12-01 PROCEDURE — 97530 THERAPEUTIC ACTIVITIES: CPT

## 2017-12-01 PROCEDURE — 82948 REAGENT STRIP/BLOOD GLUCOSE: CPT

## 2017-12-01 PROCEDURE — 99233 SBSQ HOSP IP/OBS HIGH 50: CPT | Performed by: INTERNAL MEDICINE

## 2017-12-01 PROCEDURE — G8988 SELF CARE GOAL STATUS: HCPCS

## 2017-12-01 PROCEDURE — 6360000002 HC RX W HCPCS: Performed by: INTERNAL MEDICINE

## 2017-12-01 PROCEDURE — 97535 SELF CARE MNGMENT TRAINING: CPT

## 2017-12-01 RX ORDER — OXYCODONE HCL 10 MG/1
10 TABLET, FILM COATED, EXTENDED RELEASE ORAL EVERY 12 HOURS SCHEDULED
Status: DISCONTINUED | OUTPATIENT
Start: 2017-12-01 | End: 2017-12-02 | Stop reason: HOSPADM

## 2017-12-01 RX ADMIN — POLYETHYLENE GLYCOL 3350 17 G: 17 POWDER, FOR SOLUTION ORAL at 09:46

## 2017-12-01 RX ADMIN — Medication 2 PUFF: at 21:02

## 2017-12-01 RX ADMIN — Medication 1000 MG: at 09:45

## 2017-12-01 RX ADMIN — HYDROCODONE BITARTRATE AND ACETAMINOPHEN 2 TABLET: 5; 325 TABLET ORAL at 00:03

## 2017-12-01 RX ADMIN — METOPROLOL TARTRATE 50 MG: 50 TABLET, FILM COATED ORAL at 20:17

## 2017-12-01 RX ADMIN — HYDROCODONE BITARTRATE AND ACETAMINOPHEN 2 TABLET: 5; 325 TABLET ORAL at 08:22

## 2017-12-01 RX ADMIN — ENOXAPARIN SODIUM 40 MG: 40 INJECTION SUBCUTANEOUS at 20:17

## 2017-12-01 RX ADMIN — METFORMIN HYDROCHLORIDE 425 MG: 850 TABLET, FILM COATED ORAL at 09:46

## 2017-12-01 RX ADMIN — HYDROCODONE BITARTRATE AND ACETAMINOPHEN 2 TABLET: 5; 325 TABLET ORAL at 15:54

## 2017-12-01 RX ADMIN — DOXEPIN HYDROCHLORIDE 75 MG: 50 CAPSULE ORAL at 20:17

## 2017-12-01 RX ADMIN — HYDROCODONE BITARTRATE AND ACETAMINOPHEN 2 TABLET: 5; 325 TABLET ORAL at 20:17

## 2017-12-01 RX ADMIN — INSULIN LISPRO 1 UNITS: 100 INJECTION, SOLUTION INTRAVENOUS; SUBCUTANEOUS at 20:16

## 2017-12-01 RX ADMIN — MULTIPLE VITAMINS W/ MINERALS TAB 1 TABLET: TAB at 09:46

## 2017-12-01 RX ADMIN — HYDROCODONE BITARTRATE AND ACETAMINOPHEN 2 TABLET: 5; 325 TABLET ORAL at 04:05

## 2017-12-01 RX ADMIN — CALCIUM CARBONATE-VITAMIN D TAB 500 MG-200 UNIT 1 TABLET: 500-200 TAB at 09:46

## 2017-12-01 RX ADMIN — PANTOPRAZOLE SODIUM 40 MG: 40 TABLET, DELAYED RELEASE ORAL at 06:05

## 2017-12-01 RX ADMIN — INSULIN HUMAN 35 UNITS: 100 INJECTION, SUSPENSION SUBCUTANEOUS at 20:16

## 2017-12-01 RX ADMIN — VENLAFAXINE HYDROCHLORIDE 75 MG: 75 CAPSULE, EXTENDED RELEASE ORAL at 20:17

## 2017-12-01 RX ADMIN — ATORVASTATIN CALCIUM 20 MG: 20 TABLET, FILM COATED ORAL at 20:17

## 2017-12-01 RX ADMIN — MICONAZOLE NITRATE: 2 POWDER TOPICAL at 09:46

## 2017-12-01 RX ADMIN — LOSARTAN POTASSIUM 50 MG: 50 TABLET, FILM COATED ORAL at 20:17

## 2017-12-01 RX ADMIN — METFORMIN HYDROCHLORIDE 850 MG: 850 TABLET, FILM COATED ORAL at 20:17

## 2017-12-01 RX ADMIN — OXYCODONE HYDROCHLORIDE 10 MG: 10 TABLET, FILM COATED, EXTENDED RELEASE ORAL at 22:02

## 2017-12-01 ASSESSMENT — PAIN DESCRIPTION - ONSET
ONSET: ON-GOING
ONSET: ON-GOING

## 2017-12-01 ASSESSMENT — PAIN SCALES - GENERAL
PAINLEVEL_OUTOF10: 0
PAINLEVEL_OUTOF10: 10
PAINLEVEL_OUTOF10: 0
PAINLEVEL_OUTOF10: 7
PAINLEVEL_OUTOF10: 4
PAINLEVEL_OUTOF10: 7
PAINLEVEL_OUTOF10: 7
PAINLEVEL_OUTOF10: 9
PAINLEVEL_OUTOF10: 0
PAINLEVEL_OUTOF10: 4
PAINLEVEL_OUTOF10: 8
PAINLEVEL_OUTOF10: 3

## 2017-12-01 ASSESSMENT — PAIN DESCRIPTION - ORIENTATION
ORIENTATION: RIGHT

## 2017-12-01 ASSESSMENT — PAIN DESCRIPTION - PROGRESSION
CLINICAL_PROGRESSION: GRADUALLY WORSENING
CLINICAL_PROGRESSION: GRADUALLY WORSENING

## 2017-12-01 ASSESSMENT — PAIN DESCRIPTION - PAIN TYPE
TYPE: ACUTE PAIN

## 2017-12-01 ASSESSMENT — PAIN DESCRIPTION - DESCRIPTORS
DESCRIPTORS: ACHING;DULL
DESCRIPTORS: ACHING;DULL

## 2017-12-01 ASSESSMENT — PAIN DESCRIPTION - LOCATION
LOCATION: PELVIS

## 2017-12-01 ASSESSMENT — PAIN DESCRIPTION - FREQUENCY
FREQUENCY: CONTINUOUS
FREQUENCY: CONTINUOUS

## 2017-12-01 NOTE — PROGRESS NOTES
Orthopaedic Progress Note      SUBJECTIVE   Ms. Raygoza is post fall     Patient notes right sided pelvic pain       OBJECTIVE      Physical    VITALS:  BP (!) 110/55   Pulse 83   Temp 97.5 °F (36.4 °C) (Oral)   Resp 18   Ht 5' 6\" (1.676 m)   Wt 214 lb 8 oz (97.3 kg)   LMP  (LMP Unknown)   SpO2 91%   BMI 34.62 kg/m²   I/O last 3 completed shifts: In: 1212 [P. O.:580; I.V.:632]  Out: 425 [Urine:425]      Able to flex and ext toes and ankle        Data  CBC:   Lab Results   Component Value Date    WBC 8.6 11/29/2017    HGB 12.7 11/29/2017     11/29/2017     BMP:    Lab Results   Component Value Date     11/29/2017    K 4.5 11/29/2017     11/29/2017    CO2 27 11/29/2017    BUN 18 11/29/2017    CREATININE 0.8 11/29/2017    CALCIUM 9.1 11/29/2017    GLUCOSE 233 11/29/2017    GLUCOSE 240 04/24/2012     Uric Acid:  No components found for: URIC  PT/INR:    Lab Results   Component Value Date    INR 1.08 06/28/2016     Troponin:    Lab Results   Component Value Date    TROPONINI 0.007 04/02/2012     Urine Culture:  No components found for: JESÚS      Current Inpatient Medications    Current Facility-Administered Medications: vitamin C (ASCORBIC ACID) tablet 1,000 mg, 1,000 mg, Oral, Daily  atorvastatin (LIPITOR) tablet 20 mg, 20 mg, Oral, Nightly  calcium-vitamin D (OSCAL-500) 500-200 MG-UNIT per tablet 1 tablet, 1 tablet, Oral, Daily  doxepin (SINEQUAN) capsule 75 mg, 75 mg, Oral, Nightly  losartan (COZAAR) tablet 50 mg, 50 mg, Oral, Nightly  metoprolol tartrate (LOPRESSOR) tablet 50 mg, 50 mg, Oral, Daily  miconazole (MICOTIN) 2 % powder, , Topical, BID  therapeutic multivitamin-minerals 1 tablet, 1 tablet, Oral, Daily  venlafaxine (EFFEXOR XR) extended release capsule 75 mg, 75 mg, Oral, Nightly  insulin NPH (HUMULIN N;NOVOLIN N) injection vial 35 Units, 35 Units, Subcutaneous, Nightly  metFORMIN (GLUCOPHAGE) tablet 425 mg, 425 mg, Oral, Daily with breakfast  metFORMIN (GLUCOPHAGE) tablet 850 mg, 850 mg, Oral, Nightly  glucose (GLUTOSE) 40 % oral gel 15 g, 15 g, Oral, PRN  dextrose 50 % solution 12.5 g, 12.5 g, Intravenous, PRN  glucagon (rDNA) injection 1 mg, 1 mg, Intramuscular, PRN  dextrose 5 % solution, 100 mL/hr, Intravenous, PRN  pantoprazole (PROTONIX) tablet 40 mg, 40 mg, Oral, QAM AC  MDI Treatment, , , BID **AND** mometasone-formoterol (DULERA) 100-5 MCG/ACT inhaler 2 puff, 2 puff, Inhalation, BID  insulin lispro (HUMALOG) injection vial 0-6 Units, 0-6 Units, Subcutaneous, TID WC  insulin lispro (HUMALOG) injection vial 0-3 Units, 0-3 Units, Subcutaneous, Nightly  polyethylene glycol (GLYCOLAX) packet 17 g, 17 g, Oral, Daily  magnesium hydroxide (MILK OF MAGNESIA) 400 MG/5ML suspension 30 mL, 30 mL, Oral, Daily PRN  enoxaparin (LOVENOX) injection 40 mg, 40 mg, Subcutaneous, Q24H  HYDROcodone-acetaminophen (NORCO) 5-325 MG per tablet 1 tablet, 1 tablet, Oral, Q4H PRN  HYDROcodone-acetaminophen (NORCO) 5-325 MG per tablet 2 tablet, 2 tablet, Oral, Q4H PRN  ondansetron (ZOFRAN) injection 4 mg, 4 mg, Intravenous, Q6H PRN  cyclobenzaprine (FLEXERIL) tablet 10 mg, 10 mg, Oral, TID PRN        PLAN    Awaiting ECF placement at this time

## 2017-12-01 NOTE — CARE COORDINATION
12/1/17, 12:01 PM    DISCHARGE BARRIERS      Received a call from [facility] ([place] [facility]) at [facility] who states they are able to accept patient tomorrow (12/02). Blue envelope on chart. Anticipated weekend discharge to [facility] under Medicare skill. Patient is agreeable to discharge plan. 12/1/17, 12:03 PM    Discharge plan discussed by  and . Discharge plan reviewed with patient/ family. Patient/ family verbalize understanding of discharge plan and are in agreement with plan. Understanding was demonstrated using the teach back method.    Services After Discharge  Services At/After Discharge: Skilled Therapy   IMM Letter  IMM Letter given to Patient/Family/Significant other/Guardian/POA/by[de-identified] staff  IMM Letter date given[de-identified] 11/30/17  IMM Letter time given[de-identified] 0736
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
## 2017-12-01 NOTE — PROGRESS NOTES
Hospitalist Progress Note    Patient:  Gonzalo       Unit/Bed:7K-18/018-A    YOB: 1940    MRN: [id number]       Acct: [de-identified]     PCP: Stef Almanza MD    Date of Admission: 11/29/2017    Chief Complaint: [facility] Course: admitted for pelvic fracture  And is on med mgt pending rehab placement. Subjective: \" terrible\" pain of the pelvis;nochestpain ,sob,n/v/d or calf pain      Medications:  Reviewed    Infusion Medications    dextrose       Scheduled Medications    vitamin C  1,000 mg Oral Daily    atorvastatin  20 mg Oral Nightly    calcium-vitamin D  1 tablet Oral Daily    doxepin  75 mg Oral Nightly    losartan  50 mg Oral Nightly    metoprolol tartrate  50 mg Oral Daily    miconazole   Topical BID    therapeutic multivitamin-minerals  1 tablet Oral Daily    venlafaxine  75 mg Oral Nightly    insulin NPH  35 Units Subcutaneous Nightly    metFORMIN  425 mg Oral Daily with breakfast    metFORMIN  850 mg Oral Nightly    pantoprazole  40 mg Oral QAM AC    mometasone-formoterol  2 puff Inhalation BID    insulin lispro  0-6 Units Subcutaneous TID WC    insulin lispro  0-3 Units Subcutaneous Nightly    polyethylene glycol  17 g Oral Daily    enoxaparin  40 mg Subcutaneous Q24H     PRN Meds: glucose, dextrose, glucagon (rDNA), dextrose, magnesium hydroxide, HYDROcodone 5 mg - acetaminophen, HYDROcodone 5 mg - acetaminophen, ondansetron, cyclobenzaprine      Intake/Output Summary (Last 24 hours) at 12/01/17 1842  Last data filed at 12/01/17 0010   Gross per 24 hour   Intake                0 ml   Output              175 ml   Net             -175 ml       Diet:  DIET CARB CONTROL; Exam:  BP (!) 181/73   Pulse 110   Temp 98.6 °F (37 °C) (Oral)   Resp 17   Ht 5' 6\" (1.676 m)   Wt 214 lb 8 oz (97.3 kg)   LMP  (LMP Unknown)   SpO2 93%   BMI 34.62 kg/m²     General appearance: No apparent distress, appears stated age and cooperative.   HEENT: Pupils equal, round, and reactive to light. Conjunctivae/corneas clear. Neck: Supple, with full range of motion. No jugular venous distention. Trachea midline. Respiratory:  Normal respiratory effort. Clear to auscultation, bilaterally without Rales/Wheezes/Rhonchi. Cardiovascular: Regular rate and rhythm with normal S1/S2 without murmurs, rubs or gallops. Abdomen: Soft, non-tender, non-distended with normal bowel sounds. Musculoskeletal: No clubbing, cyanosis or edema bilaterally. Limited RoM of pelvic area. Skin: Skin color, texture, turgor normal.  No rashes or lesions. Neurologic:  Neurovascularly intact without any focal sensory/motor deficits. Cranial nerves: II-XII intact, grossly non-focal.  Psychiatric: Alert and oriented, thought content appropriate, normal insight  Capillary Refill: Brisk,< 3 seconds   Peripheral Pulses: +2 palpable, equal bilaterally       Labs:   Recent Labs      11/29/17   1625   WBC  8.6   HGB  12.7   HCT  39.3   PLT  197     Recent Labs      11/29/17   1625   NA  139   K  4.5   CL  100   CO2  27   BUN  18   CREATININE  0.8   CALCIUM  9.1     No results for input(s): AST, ALT, BILIDIR, BILITOT, ALKPHOS in the last 72 hours. No results for input(s): INR in the last 72 hours. No results for input(s): Ashtyn Rebollar in the last 72 hours. Urinalysis:    Lab Results   Component Value Date    NITRU NEGATIVE 06/27/2016    WBCUA 15-25 06/27/2016    WBCUA NONE SEEN 04/15/2012    BACTERIA NONE 06/27/2016    RBCUA 0-2 06/27/2016    BLOODU NEGATIVE 06/27/2016    SPECGRAV 1.017 06/27/2016    GLUCOSEU 100 04/15/2012       Radiology:  CT ABDOMEN PELVIS WO IV CONTRAST Additional Contrast? None   Final Result   1. No acute intra-abdominal or intrapelvic findings. 2. Uncomplicated sigmoid colon diverticulosis. 3. Nondisplaced fractures of the right superior and inferior pubic rami and pubic symphysis.       Final report electronically signed by Dr. Iris Mc on 11/29/2017 6:22 PM Diet: DIET CARB CONTROL;    DVT prophylaxis: [x] Lovenox                                 [x] SCDs                                 [] SQ Heparin                                 [] Encourage ambulation           [] Already on Anticoagulation     Disposition:    [] Home       [] TCU       [x] Rehab       [] Psych       [] SNF       [] [place]       [] Other-    Code Status: Prior    PT/OT Eval Status: on board  Assessment/Plan:    Anticipated Discharge in : in 2 days    Active Hospital Problems    Diagnosis Date Noted    Essential hypertension [I10]      Priority: Medium    Fracture of superior rim of unspecified pubis, initial encounter for open fracture ([facility] [S32.519B] 11/29/2017    Pelvic ring fracture, open, initial encounter ([facility] [S32.810B] 11/29/2017    Type 2 diabetes mellitus without complication, with long-term current use of insulin (HCC) [E11.9, Z79.4]    Pain Mgt--added short term OxyContin 10 mg po tid;dvtprophy,PT,rehab;insulin scale        Electronically signed by Matias Amado MD on 12/1/2017 at [address] PM

## 2017-12-01 NOTE — PLAN OF CARE
Problem: Falls - Risk of  Goal: Absence of falls  Outcome: Ongoing  PT USES CALL LIGHT APPROPRIATELY. NO FALLS THIS SHIFT.       Problem: Pain:  Goal: Pain level will decrease  Pain level will decrease   Outcome: Ongoing   PRN PAIN MEDICATION GIVEN WHEN AVAILABLE.       Problem: Risk for Impaired Skin Integrity  Goal: Tissue integrity - skin and mucous membranes  Structural intactness and normal physiological function of skin and  mucous membranes. Outcome: Ongoing  SMALL ABRASIONS NOTED TO RIGHT WRIST AND ELBOW. NO OTHER SKIN ISSUES NOTED THIS SHIFT.       Problem: Cardiovascular  Goal: No DVT, peripheral vascular complications  Outcome: Ongoing  NO SIGNS/SYMPTOMS OF DVT THIS SHIFT.       Problem: Respiratory  Goal: O2 Sat > 90%  Outcome: Ongoing  02 SAT > 90% THIS SHIFT ON ROOM AIR.       Problem: GI  Goal: No bowel complications  Outcome: Ongoing  NO BM THIS SHIFT. BOWEL SOUNDS HYPOACTIVE. ABDOMEN SOFT. PT PASSING GAS.    Problem:   Goal: Adequate urinary output  Outcome: Ongoing  URINARY OUTPUT ADEQUATE THIS SHIFT.       Comments: Care plan reviewed with patient. Patient verbalizes understanding of the plan of care and contributes to goal setting.

## 2017-12-02 VITALS
SYSTOLIC BLOOD PRESSURE: 142 MMHG | OXYGEN SATURATION: 94 % | RESPIRATION RATE: 18 BRPM | HEART RATE: 99 BPM | WEIGHT: 214.5 LBS | TEMPERATURE: 98.9 F | HEIGHT: 66 IN | BODY MASS INDEX: 34.47 KG/M2 | DIASTOLIC BLOOD PRESSURE: 66 MMHG

## 2017-12-02 LAB
GLUCOSE BLD-MCNC: 144 MG/DL (ref 70–108)
GLUCOSE BLD-MCNC: 175 MG/DL (ref 70–108)

## 2017-12-02 PROCEDURE — 97110 THERAPEUTIC EXERCISES: CPT

## 2017-12-02 PROCEDURE — 6370000000 HC RX 637 (ALT 250 FOR IP): Performed by: PHYSICIAN ASSISTANT

## 2017-12-02 PROCEDURE — 82948 REAGENT STRIP/BLOOD GLUCOSE: CPT

## 2017-12-02 PROCEDURE — 94640 AIRWAY INHALATION TREATMENT: CPT

## 2017-12-02 PROCEDURE — 99232 SBSQ HOSP IP/OBS MODERATE 35: CPT | Performed by: INTERNAL MEDICINE

## 2017-12-02 PROCEDURE — 97530 THERAPEUTIC ACTIVITIES: CPT

## 2017-12-02 PROCEDURE — 6370000000 HC RX 637 (ALT 250 FOR IP): Performed by: ORTHOPAEDIC SURGERY

## 2017-12-02 PROCEDURE — 6370000000 HC RX 637 (ALT 250 FOR IP): Performed by: INTERNAL MEDICINE

## 2017-12-02 PROCEDURE — 97116 GAIT TRAINING THERAPY: CPT

## 2017-12-02 RX ORDER — SODIUM PHOSPHATE, DIBASIC AND SODIUM PHOSPHATE, MONOBASIC 7; 19 G/133ML; G/133ML
1 ENEMA RECTAL
Status: COMPLETED | OUTPATIENT
Start: 2017-12-02 | End: 2017-12-02

## 2017-12-02 RX ORDER — SODIUM PHOSPHATE, DIBASIC AND SODIUM PHOSPHATE, MONOBASIC 7; 19 G/133ML; G/133ML
ENEMA RECTAL
Status: DISCONTINUED
Start: 2017-12-02 | End: 2017-12-02 | Stop reason: HOSPADM

## 2017-12-02 RX ORDER — HYDROCODONE BITARTRATE AND ACETAMINOPHEN 5; 325 MG/1; MG/1
2 TABLET ORAL EVERY 6 HOURS PRN
Qty: 50 TABLET | Refills: 0 | Status: SHIPPED | OUTPATIENT
Start: 2017-12-02 | End: 2017-12-09

## 2017-12-02 RX ORDER — OXYCODONE HCL 10 MG/1
10 TABLET, FILM COATED, EXTENDED RELEASE ORAL EVERY 12 HOURS
Qty: 28 TABLET | Refills: 0 | Status: SHIPPED | OUTPATIENT
Start: 2017-12-02 | End: 2017-12-16

## 2017-12-02 RX ADMIN — METFORMIN HYDROCHLORIDE 425 MG: 850 TABLET, FILM COATED ORAL at 09:44

## 2017-12-02 RX ADMIN — HYDROCODONE BITARTRATE AND ACETAMINOPHEN 2 TABLET: 5; 325 TABLET ORAL at 04:28

## 2017-12-02 RX ADMIN — Medication 1000 MG: at 09:44

## 2017-12-02 RX ADMIN — SODIUM PHOSPHATE 1 ENEMA: 7; 19 ENEMA RECTAL at 11:11

## 2017-12-02 RX ADMIN — MULTIPLE VITAMINS W/ MINERALS TAB 1 TABLET: TAB at 09:44

## 2017-12-02 RX ADMIN — MICONAZOLE NITRATE: 2 POWDER TOPICAL at 09:45

## 2017-12-02 RX ADMIN — HYDROCODONE BITARTRATE AND ACETAMINOPHEN 2 TABLET: 5; 325 TABLET ORAL at 14:46

## 2017-12-02 RX ADMIN — CYCLOBENZAPRINE HYDROCHLORIDE 10 MG: 10 TABLET, FILM COATED ORAL at 09:47

## 2017-12-02 RX ADMIN — Medication 1 UNITS: at 17:12

## 2017-12-02 RX ADMIN — CALCIUM CARBONATE-VITAMIN D TAB 500 MG-200 UNIT 1 TABLET: 500-200 TAB at 09:44

## 2017-12-02 RX ADMIN — OXYCODONE HYDROCHLORIDE 10 MG: 10 TABLET, FILM COATED, EXTENDED RELEASE ORAL at 09:47

## 2017-12-02 RX ADMIN — POLYETHYLENE GLYCOL 3350 17 G: 17 POWDER, FOR SOLUTION ORAL at 09:44

## 2017-12-02 RX ADMIN — Medication 1 UNITS: at 14:46

## 2017-12-02 RX ADMIN — HYDROCODONE BITARTRATE AND ACETAMINOPHEN 2 TABLET: 5; 325 TABLET ORAL at 09:47

## 2017-12-02 RX ADMIN — Medication 2 PUFF: at 09:03

## 2017-12-02 RX ADMIN — PANTOPRAZOLE SODIUM 40 MG: 40 TABLET, DELAYED RELEASE ORAL at 06:46

## 2017-12-02 ASSESSMENT — PAIN DESCRIPTION - PROGRESSION: CLINICAL_PROGRESSION: GRADUALLY WORSENING

## 2017-12-02 ASSESSMENT — PAIN DESCRIPTION - ORIENTATION
ORIENTATION: RIGHT

## 2017-12-02 ASSESSMENT — PAIN SCALES - GENERAL
PAINLEVEL_OUTOF10: 4
PAINLEVEL_OUTOF10: 4
PAINLEVEL_OUTOF10: 8
PAINLEVEL_OUTOF10: 7
PAINLEVEL_OUTOF10: 3
PAINLEVEL_OUTOF10: 4
PAINLEVEL_OUTOF10: 9
PAINLEVEL_OUTOF10: 10
PAINLEVEL_OUTOF10: 3

## 2017-12-02 ASSESSMENT — PAIN DESCRIPTION - LOCATION
LOCATION: PELVIS

## 2017-12-02 ASSESSMENT — PAIN DESCRIPTION - FREQUENCY
FREQUENCY: CONTINUOUS
FREQUENCY: CONTINUOUS

## 2017-12-02 ASSESSMENT — PAIN DESCRIPTION - PAIN TYPE
TYPE: ACUTE PAIN

## 2017-12-02 ASSESSMENT — PAIN DESCRIPTION - DESCRIPTORS
DESCRIPTORS: ACHING;DULL
DESCRIPTORS: ACHING;DULL
DESCRIPTORS: ACHING

## 2017-12-02 ASSESSMENT — PAIN DESCRIPTION - ONSET
ONSET: ON-GOING

## 2017-12-02 NOTE — PLAN OF CARE
Problem: Impaired respiratory status  Goal: Lung sounds clear or within normal limits for patient  Outcome: Ongoing  Patient has clear and diminished breath sounds. Continue treatment as ordered.

## 2017-12-02 NOTE — CONSULTS
Brief Intervention and Referral to Treatment Summary  Pt denies any history of depression or AoD. Patient was provided PHQ-9, AUDIT and DAST Screening:      PHQ-9 Score:  {WILDCARD (Optional):87580[de-identified] N/A  AUDIT Score:  {WILDCARD (Optional):32652[de-identified] N/A  DAST Score:  {WILDCARD (Optional):44768[de-identified] N/A    Patients substance use is considered    Low Risk/Healthy XX  Risk  Harmful  Dependent    Patients depression is considered:    Minimal XX  Mild   Moderate  Moderately Severe  Severe    Brief Education was provided:    Pt does not require any brief education      Brief Intervention Is Provided (Only for AUDIT or DAST, there is no brief intervention for Depression)    Pt has no current or past history of AoD    Recommendations/Referrals for Brief and/or Specialized Treatment Provided to Patient  No recommendations or referrals needed at this time.

## 2017-12-02 NOTE — PLAN OF CARE
Problem: Falls - Risk of  Goal: Absence of falls  Outcome: Ongoing  No falls this shift. Pt using call light appropriately to call for assistance. pt is also compliant with use of non-skid slippers. Pt reports understanding of fall prevention when discussed. Pt will not get out of bed this shift. She states that she will use the bedpan    Problem: Pain:  Goal: Pain level will decrease  Pain level will decrease   Outcome: Ongoing  Pt's rt side pelvis pain has been 3-7/10 on the pain scale. Pt pain goal is 5. Pt tolerating PO pain meds and getting periods of rest.  Goal: Control of acute pain  Control of acute pain   Outcome: Completed Date Met: 19/49/68  duplicate  Goal: Control of chronic pain  Control of chronic pain   Outcome: Completed Date Met: 82/20/14  duplicate    Problem: Risk for Impaired Skin Integrity  Goal: Tissue integrity - skin and mucous membranes  Structural intactness and normal physiological function of skin and  mucous membranes. Outcome: Ongoing  Pt has abrasions to rt wrist and elbow. Bruising at right hip. No other skin breakdown noted. Pt is refusing to turn. Pt and family educated on possible risk of skin breakdown/pressure ulcers. Pt only turns to use bedpan    Problem: Cardiovascular  Goal: No DVT, peripheral vascular complications  Outcome: Ongoing  No s/s of DVT. Pt compliant with scds and lovenox    Problem: Respiratory  Goal: O2 Sat > 90%  Outcome: Completed Date Met: 12/02/17  Pt on room air with O2 sat of 95%. lungs clear    Problem: GI  Goal: No bowel complications  Outcome: Ongoing  No BM this shift.  Pt has active bowel sounds and passing gas    Problem:   Goal: Adequate urinary output  Outcome: Ongoing  Pt has been voiding adequate amount of shmuel urine    Problem: Nutrition  Goal: Optimal nutrition therapy  Outcome: Ongoing  Pt tolerating a carb control diet    Problem: DISCHARGE BARRIERS  Goal: Patient's continuum of care needs are met  Outcome: Ongoing  Pt going to [facility]

## 2017-12-02 NOTE — PROGRESS NOTES
distress, appears stated age and cooperative. HEENT: Pupils equal, round, and reactive to light. Conjunctivae/corneas clear. Neck: Supple, with full range of motion. No jugular venous distention. Trachea midline. Respiratory:  Normal respiratory effort. Clear to auscultation, bilaterally without Rales/Wheezes/Rhonchi. Cardiovascular: Regular rate and rhythm with normal S1/S2 without murmurs, rubs or gallops. Abdomen: Soft, non-tender, non-distended with normal bowel sounds. Musculoskeletal: No clubbing, cyanosis or edema bilaterally. Limited RoM of pelvic area. Skin: Skin color, texture, turgor normal.  No rashes or lesions. Neurologic:  Neurovascularly intact without any focal sensory/motor deficits. Cranial nerves: II-XII intact, grossly non-focal.  Psychiatric: Alert and oriented, thought content appropriate, normal insight  Capillary Refill: Brisk,< 3 seconds   Peripheral Pulses: +2 palpable, equal bilaterally       Labs:   No results for input(s): WBC, HGB, HCT, PLT in the last 72 hours. No results for input(s): NA, K, CL, CO2, BUN, CREATININE, CALCIUM, PHOS in the last 72 hours. Invalid input(s): MAGNES  No results for input(s): AST, ALT, BILIDIR, BILITOT, ALKPHOS in the last 72 hours. No results for input(s): INR in the last 72 hours. No results for input(s): Perlie Gilmer in the last 72 hours. Urinalysis:      Lab Results   Component Value Date    NITRU NEGATIVE 06/27/2016    WBCUA 15-25 06/27/2016    WBCUA NONE SEEN 04/15/2012    BACTERIA NONE 06/27/2016    RBCUA 0-2 06/27/2016    BLOODU NEGATIVE 06/27/2016    SPECGRAV 1.017 06/27/2016    GLUCOSEU 100 04/15/2012       Radiology:  CT ABDOMEN PELVIS WO IV CONTRAST Additional Contrast? None   Final Result   1. No acute intra-abdominal or intrapelvic findings. 2. Uncomplicated sigmoid colon diverticulosis. 3. Nondisplaced fractures of the right superior and inferior pubic rami and pubic symphysis.       Final report electronically signed

## 2017-12-02 NOTE — PROGRESS NOTES
Discharged to [facility] per [facility] with personal belongings.  to follow. Report called to Vikki Reyna. Questions answered. No needs at this time.

## 2017-12-02 NOTE — PROGRESS NOTES
performed supine B LE ther ex 10x each consisting of ankle pumps, glute/quad sets, heel slides, hip abd/add, and short arc quads all to increase strength and improve functional mobility. Pt. requests assistance with nearly all ther ex. Activity Tolerance:  Activity Tolerance: Patient limited by fatigue;Patient limited by endurance; Patient limited by pain    Assessment: Body structures, Functions, Activity limitations: Decreased functional mobility , Decreased endurance, Decreased strength, Decreased ROM  Assessment: Pt. tolerated session fair. Pt. requires encouragement to be more independant as she requests assistance with nearly all activity. Pt. limited by pain and cries out with nearly all movement. Pt. would benefit from continued skilled PT to increase strength and improve functional mobility. Prognosis: Good  REQUIRES PT FOLLOW UP: Yes  Discharge Recommendations: Continue to assess pending progress, Subacute/Skilled Nursing Facility    Patient Education:  Patient Education: POC, Ther ex, Ambulation, Transfers    Equipment Recommendations:  Equipment Needed: No    Safety:  Type of devices: Call light within reach, Patient at risk for falls, Gait belt, Nurse notified, All fall risk precautions in place (Pt. left sitting on BS commode with tech present)  Restraints  Initially in place: No    Plan:  Times per week: 6 X O  Times per day: Daily  Specific instructions for Next Treatment: B LE strengthening, transfers, ambulation  Current Treatment Recommendations: Strengthening, Functional Mobility Training, Balance Training, Transfer Training, Endurance Training, Gait Training, Home Exercise Program, Safety Education & Training, Patient/Caregiver Education & Training    Goals:  Patient goals : To get better and go to NH for rehab    Short term goals  Time Frame for Short term goals: 1 week  Short term goal 1: Pt to participate in supine B LE AROM to increase strength for improved mobility.   Short term goal 2: Pt to be Min A x 1 for supine <> sit to get in/out of bed  Short term goal 3: Pt to be Min A x 1 for sit <> stand to get up to ambulate  Short term goal 4: Pt to ambulate >15 ft with SW with Min A x 1 to get to bathroom    Long term goals  Time Frame for Long term goals : not set due to short ELOS         AM-PAC Inpatient Mobility without Stair Climbing Raw Score : 9  AM-PAC Inpatient without Stair Climbing T-Scale Score : 32.44  Mobility Inpatient CMS 0-100% Score: 76.07  Mobility Inpatient without Stair CMS G-Code Modifier : CL

## 2017-12-02 NOTE — PROGRESS NOTES
75 mg, 75 mg, Oral, Nightly  insulin NPH (HUMULIN N;NOVOLIN N) injection vial 35 Units, 35 Units, Subcutaneous, Nightly  metFORMIN (GLUCOPHAGE) tablet 425 mg, 425 mg, Oral, Daily with breakfast  metFORMIN (GLUCOPHAGE) tablet 850 mg, 850 mg, Oral, Nightly  glucose (GLUTOSE) 40 % oral gel 15 g, 15 g, Oral, PRN  dextrose 50 % solution 12.5 g, 12.5 g, Intravenous, PRN  glucagon (rDNA) injection 1 mg, 1 mg, Intramuscular, PRN  dextrose 5 % solution, 100 mL/hr, Intravenous, PRN  pantoprazole (PROTONIX) tablet 40 mg, 40 mg, Oral, QAM AC  MDI Treatment, , , BID **AND** mometasone-formoterol (DULERA) 100-5 MCG/ACT inhaler 2 puff, 2 puff, Inhalation, BID  insulin lispro (HUMALOG) injection vial 0-6 Units, 0-6 Units, Subcutaneous, TID WC  insulin lispro (HUMALOG) injection vial 0-3 Units, 0-3 Units, Subcutaneous, Nightly  polyethylene glycol (GLYCOLAX) packet 17 g, 17 g, Oral, Daily  magnesium hydroxide (MILK OF MAGNESIA) 400 MG/5ML suspension 30 mL, 30 mL, Oral, Daily PRN  enoxaparin (LOVENOX) injection 40 mg, 40 mg, Subcutaneous, Q24H  HYDROcodone-acetaminophen (NORCO) 5-325 MG per tablet 1 tablet, 1 tablet, Oral, Q4H PRN  HYDROcodone-acetaminophen (NORCO) 5-325 MG per tablet 2 tablet, 2 tablet, Oral, Q4H PRN  ondansetron (ZOFRAN) injection 4 mg, 4 mg, Intravenous, Q6H PRN  cyclobenzaprine (FLEXERIL) tablet 10 mg, 10 mg, Oral, TID PRN        PLAN    OK for ECF

## 2017-12-06 ASSESSMENT — ENCOUNTER SYMPTOMS
WHEEZING: 0
NAUSEA: 0
ABDOMINAL PAIN: 0
EYE DISCHARGE: 0
RHINORRHEA: 0
SHORTNESS OF BREATH: 0
EYE PAIN: 0
VOMITING: 0
BACK PAIN: 0
SORE THROAT: 0
COUGH: 0
DIARRHEA: 0

## 2018-05-08 ENCOUNTER — HOSPITAL ENCOUNTER (OUTPATIENT)
Dept: INTERVENTIONAL RADIOLOGY/VASCULAR | Age: 78
Discharge: HOME | End: 2018-05-08
Payer: MEDICARE

## 2018-05-08 DIAGNOSIS — R09.89 DIMINISHED PULSES IN LOWER EXTREMITY: ICD-10-CM

## 2018-05-08 DIAGNOSIS — Z87.81 HISTORY OF FRACTURE: ICD-10-CM

## 2018-05-08 PROCEDURE — 93925 LOWER EXTREMITY STUDY: CPT

## 2020-08-03 ENCOUNTER — HOSPITAL ENCOUNTER (OUTPATIENT)
Dept: INTERVENTIONAL RADIOLOGY/VASCULAR | Age: 80
Discharge: HOME | End: 2020-08-03
Payer: MEDICARE

## 2020-08-03 PROCEDURE — 93922 UPR/L XTREMITY ART 2 LEVELS: CPT

## 2021-01-20 ENCOUNTER — IMMUNIZATION (OUTPATIENT)
Dept: PRIMARY CARE CLINIC | Age: 81
End: 2021-01-20
Payer: MEDICARE

## 2021-01-20 PROCEDURE — 0001A COVID-19, PFIZER VACCINE 30MCG/0.3ML DOSE: CPT | Performed by: FAMILY MEDICINE

## 2021-01-20 PROCEDURE — 91300 COVID-19, PFIZER VACCINE 30MCG/0.3ML DOSE: CPT | Performed by: FAMILY MEDICINE

## 2021-02-10 ENCOUNTER — IMMUNIZATION (OUTPATIENT)
Dept: PRIMARY CARE CLINIC | Age: 81
End: 2021-02-10
Payer: MEDICARE

## 2021-02-10 PROCEDURE — 91300 COVID-19, PFIZER VACCINE 30MCG/0.3ML DOSE: CPT | Performed by: FAMILY MEDICINE

## 2021-02-10 PROCEDURE — 0002A COVID-19, PFIZER VACCINE 30MCG/0.3ML DOSE: CPT | Performed by: FAMILY MEDICINE

## 2022-10-17 ENCOUNTER — TELEPHONE (OUTPATIENT)
Dept: CARDIOLOGY CLINIC | Age: 82
End: 2022-10-17

## 2022-10-17 NOTE — TELEPHONE ENCOUNTER
Patient's , Harriet Barroso, is calling to request a new pt appt with [facility] for [place]. Harriet Barroso is a pt of  [facility]. He said that she has been having swelling in both feet and they are purple. She was advised by Dr. Paul Piedra to f/u with a cardiologist.  No available appts within the next week, patient prefers afternoon appt times.   Please advise  [phone]

## 2022-10-20 ENCOUNTER — OFFICE VISIT (OUTPATIENT)
Dept: CARDIOLOGY CLINIC | Age: 82
End: 2022-10-20
Payer: MEDICARE

## 2022-10-20 VITALS
HEART RATE: 97 BPM | SYSTOLIC BLOOD PRESSURE: 144 MMHG | HEIGHT: 66 IN | BODY MASS INDEX: 34.62 KG/M2 | DIASTOLIC BLOOD PRESSURE: 82 MMHG

## 2022-10-20 DIAGNOSIS — R60.9 EDEMA, UNSPECIFIED TYPE: ICD-10-CM

## 2022-10-20 DIAGNOSIS — I10 PRIMARY HYPERTENSION: ICD-10-CM

## 2022-10-20 DIAGNOSIS — R06.02 SHORTNESS OF BREATH: ICD-10-CM

## 2022-10-20 DIAGNOSIS — R06.02 SOB (SHORTNESS OF BREATH): Primary | ICD-10-CM

## 2022-10-20 PROCEDURE — 93000 ELECTROCARDIOGRAM COMPLETE: CPT | Performed by: NUCLEAR MEDICINE

## 2022-10-20 PROCEDURE — 99204 OFFICE O/P NEW MOD 45 MIN: CPT | Performed by: NUCLEAR MEDICINE

## 2022-10-20 PROCEDURE — 1123F ACP DISCUSS/DSCN MKR DOCD: CPT | Performed by: NUCLEAR MEDICINE

## 2022-10-20 RX ORDER — CLOPIDOGREL BISULFATE 75 MG/1
75 TABLET ORAL DAILY
COMMUNITY

## 2022-10-20 ASSESSMENT — ENCOUNTER SYMPTOMS
BLOOD IN STOOL: 0
NAUSEA: 0
BACK PAIN: 1
ABDOMINAL DISTENTION: 0
SHORTNESS OF BREATH: 1
DIARRHEA: 0
PHOTOPHOBIA: 0
ANAL BLEEDING: 0
VOMITING: 0
ABDOMINAL PAIN: 0
COLOR CHANGE: 0
CONSTIPATION: 0
CHEST TIGHTNESS: 0
RECTAL PAIN: 0
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
## 2022-10-21 ENCOUNTER — TELEPHONE (OUTPATIENT)
Dept: CARDIOLOGY CLINIC | Age: 82
End: 2022-10-21

## 2022-10-21 NOTE — TELEPHONE ENCOUNTER
Patient is scheduled for an echo and a cardiac cath on 10/28/2022. Cath is not meeting medical necessity, and patient has not had any non invasive cardiac testing to warrant a cardiac cath per insurance. Per Dr. Praveen Jc schedule naima with echo, cardiac cath to be scheduled after the results from echo, and stress test are back. I left patient a message to inform them of new orders. Naima scan order placed.

## 2022-10-21 NOTE — TELEPHONE ENCOUNTER
PROCEDURE: stress tests, and echo     DATE OF SERVICE: 10/28/2022    SERVICE LOCATION: [facility]    CPT CODE: [id number], [id number]    PHYSICIAN: parveen    DATE PRIOR AUTH SUBMITTED: 10/21/2022    STATUS: APPROVED.      AUTH NUMBER: [id number]    VALID:  10/21/2022-01/18/2023

## 2022-10-21 NOTE — TELEPHONE ENCOUNTER
Patient returned my call and is agreeable to stress test and echo. I informed patient that she will receive a call from our scheduling office to set the testing up. Patient voiced understanding.

## 2022-10-28 ENCOUNTER — HOSPITAL ENCOUNTER (OUTPATIENT)
Dept: NON INVASIVE DIAGNOSTICS | Age: 82
Discharge: HOME | End: 2022-10-28
Payer: MEDICARE

## 2022-10-28 DIAGNOSIS — I10 PRIMARY HYPERTENSION: ICD-10-CM

## 2022-10-28 DIAGNOSIS — R06.02 SHORTNESS OF BREATH: ICD-10-CM

## 2022-10-28 DIAGNOSIS — R06.02 SOB (SHORTNESS OF BREATH): ICD-10-CM

## 2022-10-28 DIAGNOSIS — R60.9 EDEMA, UNSPECIFIED TYPE: ICD-10-CM

## 2022-10-28 LAB
LV EF: 55 %
LVEF MODALITY: NORMAL

## 2022-10-28 PROCEDURE — 3430000000 HC RX DIAGNOSTIC RADIOPHARMACEUTICAL: Performed by: NUCLEAR MEDICINE

## 2022-10-28 PROCEDURE — A9500 TC99M SESTAMIBI: HCPCS | Performed by: NUCLEAR MEDICINE

## 2022-10-28 PROCEDURE — 93306 TTE W/DOPPLER COMPLETE: CPT

## 2022-10-28 PROCEDURE — 78452 HT MUSCLE IMAGE SPECT MULT: CPT

## 2022-10-28 PROCEDURE — 93017 CV STRESS TEST TRACING ONLY: CPT | Performed by: NUCLEAR MEDICINE

## 2022-10-28 PROCEDURE — 6360000002 HC RX W HCPCS

## 2022-10-28 RX ADMIN — Medication 32.9 MILLICURIE: at 13:35

## 2022-10-28 RX ADMIN — Medication 9.5 MILLICURIE: at 12:40

## 2022-12-07 DIAGNOSIS — R06.02 SOB (SHORTNESS OF BREATH): ICD-10-CM

## 2022-12-07 DIAGNOSIS — R60.9 EDEMA, UNSPECIFIED TYPE: Primary | ICD-10-CM

## 2022-12-07 RX ORDER — POTASSIUM CHLORIDE 20 MEQ/1
20 TABLET, EXTENDED RELEASE ORAL DAILY
Qty: 30 TABLET | Refills: 3 | Status: SHIPPED | OUTPATIENT
Start: 2022-12-07

## 2022-12-07 RX ORDER — BUMETANIDE 1 MG/1
1 TABLET ORAL DAILY
Qty: 30 TABLET | Refills: 3 | Status: SHIPPED | OUTPATIENT
Start: 2022-12-07

## 2022-12-12 NOTE — TELEPHONE ENCOUNTER
Unsure how to order liquid potassium. Can [facility] place order and send to Dr. Pipo Torres to sign please?

## 2022-12-12 NOTE — TELEPHONE ENCOUNTER
Spoke with patient   She states he  has a small potassium pill and till talk to the pharmacist to see what it is   She would prefer that over a liquid and call Dr. Adhikari Phill office

## 2022-12-12 NOTE — TELEPHONE ENCOUNTER
Patient left message   Not seen yet by CHF clinic  States she can not swallow potassium pills you ordered  Do you want liquid? Please advise?

## 2023-01-05 ENCOUNTER — TELEPHONE (OUTPATIENT)
Dept: CARDIOLOGY CLINIC | Age: 83
End: 2023-01-05

## 2023-01-05 NOTE — TELEPHONE ENCOUNTER
Patient canceled new patient appointment in CHF clinic referred by Dr. Gale Myers and does not want to r/s

## 2023-02-06 NOTE — CARE COORDINATION
PCP - Devon Langston MD  Referring Physician:     Subjective:   Patient ID:  Dariel Urbina is a 80 y.o. 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 Devine for MitraClip evaluation. Patient states that over the past month he has been having intermittent chest pain while walking. Pain does not radiate and is relievedby rest. PET stress positive in RCA/Lcx territory. He has also experienced lower extremity edema and shortness of breath. He was seen by  who referred him for MitraClip. Denies palpitations and claudication. Patient also reports R arm pain due to recent fall.    History:     Social History     Tobacco Use    Smoking status: Former     Packs/day: 1.00     Years: 20.00     Pack years: 20.00     Types: Cigarettes     Quit date: 1980     Years since quittin.4     Passive exposure: Never    Smokeless tobacco: Never   Substance Use Topics    Alcohol use: No     Family History   Problem Relation Age of Onset    Heart failure Mother     Heart disease Mother     Heart failure Father     Heart disease Father     Alcohol abuse Father     Heart failure Brother     Heart disease Brother     Diabetes Brother     No Known Problems Sister     No Known Problems Maternal Grandmother     No Known Problems Maternal Grandfather     No Known Problems Paternal Grandmother     No Known Problems Paternal Grandfather     Heart disease Sister     No Known Problems Maternal Aunt     No Known Problems Maternal Uncle     No Known Problems Paternal Aunt     No Known Problems Paternal Uncle     Amblyopia Neg Hx     Blindness Neg Hx     Cancer Neg Hx     Cataracts Neg Hx     Glaucoma Neg Hx     Hypertension Neg Hx     Macular degeneration Neg Hx     Retinal detachment Neg Hx     Strabismus Neg Hx     Stroke Neg Hx     Thyroid disease Neg Hx     Anemia Neg Hx     Arrhythmia Neg Hx     Asthma Neg  Brief ED Social Work Assessment  11/29/17, 4:42 PM    Patient updated that [facility] has no beds and that at this time plan for patient to be admitted. Hx     Clotting disorder Neg Hx     Fainting Neg Hx     Heart attack Neg Hx     Hyperlipidemia Neg Hx     Atrial Septal Defect Neg Hx     Melanoma Neg Hx        Meds:     Review of patient's allergies indicates:   Allergen Reactions    Fosinopril      Intolerance- elevates potassium level      Losartan      Intolerance- elevates potassium level       Current Outpatient Medications:     allopurinoL (ZYLOPRIM) 100 MG tablet, TAKE 1 TABLET EVERY DAY, Disp: 90 tablet, Rfl: 3    bumetanide (BUMEX) 1 MG tablet, Take 1 tablet (1 mg total) by mouth once daily., Disp: 30 tablet, Rfl: 11    cephALEXin (KEFLEX) 500 MG capsule, Take 1 capsule (500 mg total) by mouth 3 (three) times daily. for 10 days, Disp: 30 capsule, Rfl: 0    ferrous gluconate (FERGON) 324 MG tablet, Take 1 tablet (324 mg total) by mouth daily with breakfast., Disp: 90 tablet, Rfl: 1    isosorbide mononitrate (IMDUR) 60 MG 24 hr tablet, Take 1 tablet (60 mg total) by mouth once daily., Disp: 30 tablet, Rfl: 1    lisinopriL (PRINIVIL,ZESTRIL) 2.5 MG tablet, Take 1 tablet (2.5 mg total) by mouth once daily., Disp: 30 tablet, Rfl: 1    loperamide (IMODIUM) 2 mg capsule, Take 2 mg by mouth 4 (four) times daily as needed for Diarrhea., Disp: , Rfl:     oxymetazoline (AFRIN) 0.05 % nasal spray, Use 2 sprays by Nasal route daily as needed (nose bleed)., Disp: 30 mL, Rfl: 0    rosuvastatin (CRESTOR) 40 MG Tab, TAKE 1 TABLET EVERY EVENING., Disp: 90 tablet, Rfl: 3    warfarin (COUMADIN) 5 MG tablet, warfarin 7.5 (1.5 tablets) sun and Thursday, and 5mg other days, Disp: 30 tablet, Rfl: 11    carvediloL (COREG) 12.5 MG tablet, , Disp: , Rfl:     HYDROcodone-acetaminophen (NORCO) 5-325 mg per tablet, Take 1 tablet by mouth every 6 (six) hours as needed for Pain. (Patient not taking: Reported on 2/6/2023), Disp: 10 tablet, Rfl: 0    metoprolol succinate (TOPROL-XL) 25 MG 24 hr tablet, Take 1 tablet (25 mg total) by mouth once daily. (Patient not taking: Reported on  "2/6/2023), Disp: 30 tablet, Rfl: 0    omega-3 fatty acids/fish oil (FISH OIL-OMEGA-3 FATTY ACIDS) 300-1,000 mg capsule, Take 1 capsule by mouth once daily., Disp: , Rfl:   No current facility-administered medications for this visit.    Facility-Administered Medications Ordered in Other Visits:     ondansetron injection 4 mg, 4 mg, Intravenous, Daily PRN, Tara Hernandez MD    ondansetron injection 4 mg, 4 mg, Intravenous, Daily PRN, Tara Hernandez MD    phenylephrine HCL 2.5% ophthalmic solution 1 drop, 1 drop, Left Eye, On Call Procedure, Alia Mckeon MD, 1 drop at 11/13/22 0630    phenylephrine HCL 2.5% ophthalmic solution 1 drop, 1 drop, Right Eye, On Call Procedure, Alia Mckeon MD, 1 drop at 12/04/22 0630    proparacaine 0.5 % ophthalmic solution 1 drop, 1 drop, Left Eye, On Call Procedure, Alia Mckeon MD, 1 drop at 11/13/22 0620    proparacaine 0.5 % ophthalmic solution 1 drop, 1 drop, Right Eye, On Call Procedure, Alia Mckeon MD, 1 drop at 12/04/22 0620    tropicamide 1% ophthalmic solution 1 drop, 1 drop, Left Eye, On Call Procedure, Alia Mckeon MD, 1 drop at 11/13/22 0630    tropicamide 1% ophthalmic solution 1 drop, 1 drop, Right Eye, On Call Procedure, Alia Mckeon MD, 1 drop at 12/04/22 0635        Objective:   BP (!) 186/73 (BP Location: Left arm, Patient Position: Sitting, BP Method: Large (Automatic))   Pulse (!) 32   Ht 5' 5" (1.651 m)   Wt 78 kg (171 lb 15.3 oz)   SpO2 98%   BMI 28.62 kg/m²     Physical Exam  Gen: No apparent distress, resting comfortably  HEENT: Pupils equal and reactive to light  Cardio: Regular rate, point of maximal impulse not displaced, no murmur noted, 2+ radial pulses bilaterally, 2+ DP pulses bilaterally  Resp: CTAB, no wheezing  Abd: Soft, non-tender, non-distended  Skin: Warm, dry, 1+ edema noted  Neuro: Alert and oriented x3  Psych: Normal mood and affect      Labs:     Lab Results   Component Value Date     02/01/2023 "    K 5.1 02/01/2023     02/01/2023    CO2 23 02/01/2023    BUN 51 (H) 02/01/2023    CREATININE 2.2 (H) 02/01/2023    ANIONGAP 9 02/01/2023     Lab Results   Component Value Date    HGBA1C 5.2 02/01/2023     Lab Results   Component Value Date    BNP 1,188 (H) 12/29/2022     (H) 12/21/2022    BNP 2,348 (H) 10/23/2022       Lab Results   Component Value Date    WBC 4.96 12/31/2022    WBC 4.96 12/31/2022    HGB 8.8 (L) 01/09/2023    HCT 27.5 (L) 12/31/2022    HCT 27.5 (L) 12/31/2022     12/31/2022     12/31/2022    GRAN 2.8 12/31/2022    GRAN 55.7 12/31/2022    GRAN 2.8 12/31/2022    GRAN 55.7 12/31/2022     Lab Results   Component Value Date    CHOL 103 (L) 02/10/2022    HDL 34 (L) 02/10/2022    LDLCALC 43.4 (L) 02/10/2022    TRIG 128 02/10/2022       Lab Results   Component Value Date     02/01/2023    K 5.1 02/01/2023     02/01/2023    CO2 23 02/01/2023    BUN 51 (H) 02/01/2023    CREATININE 2.2 (H) 02/01/2023    ANIONGAP 9 02/01/2023     Lab Results   Component Value Date    HGBA1C 5.2 02/01/2023     Lab Results   Component Value Date    BNP 1,188 (H) 12/29/2022     (H) 12/21/2022    BNP 2,348 (H) 10/23/2022    Lab Results   Component Value Date    WBC 4.96 12/31/2022    WBC 4.96 12/31/2022    HGB 8.8 (L) 01/09/2023    HCT 27.5 (L) 12/31/2022    HCT 27.5 (L) 12/31/2022     12/31/2022     12/31/2022    GRAN 2.8 12/31/2022    GRAN 55.7 12/31/2022    GRAN 2.8 12/31/2022    GRAN 55.7 12/31/2022     Lab Results   Component Value Date    CHOL 103 (L) 02/10/2022    HDL 34 (L) 02/10/2022    LDLCALC 43.4 (L) 02/10/2022    TRIG 128 02/10/2022                Cardiovascular Imaging:     Echo 12/30/22:  The left ventricle is normal in size with mild concentric hypertrophy and mildly decreased systolic function.  The estimated ejection fraction is 48%.  There are segmental left ventricular wall motion abnormalities.  There is abnormal septal wall motion.  Grade III left  ventricular diastolic dysfunction.  Severe left atrial enlargement.  Normal right ventricular size with normal right ventricular systolic function.  Mild right atrial enlargement.  There is a mechanical aortic valve present.  The aortic valve mean gradient is 17 mmHg with a dimensionless index of 0.38.  Moderate to moderate -severe ( 2-3+) MR  Moderate tricuspid regurgitation.  Normal central venous pressure (3 mmHg).  The estimated PA systolic pressure is 62 mmHg.  There is at least moderate pulmonary hypertension.     PET Stress test:   There is a very small (< 5%) sized, moderate intensity basal to distal inferior and inferolateral resting perfusion abnormality involving 3% of LV myocardium in the distribution of the LCX and RCA. After pharmacologic stress, this perfusion abnormality is larger and more severe involving 16% of the LV myocardium, indicative of ischemia with underlying scar.    Within the perfusion abnormality, absolute myocardial perfusion (cc/min/gm) averaged 0.61 cc/min/g at rest, 0.58 cc/min/g at stress and CFR was 0.95 , which equates to severely reduced coronary flow capacity within the LCX and RCA territory.    There are no other significant perfusion abnormalities.    The whole heart absolute myocardial perfusion values averaged 0.89 cc/min/g at rest, which is normal; 1.10 cc/min/g at stress, which is mildly reduced; and CFR is 1.23 , which is severely reduced.    CT attenuation images demonstrate severe diffuse coronary calcifications in the LAD, LCX, moderate calcification in the RCA territory and severe diffuse aortic calcifications in the ascending aorta and descending aorta.    The gated perfusion images showed an ejection fraction of 46% at rest and 46% during stress. A normal ejection fraction is greater than 47%.    There is mild global hypokinesis at rest and during stress.    The LV cavity size is normal at rest and stress.    The ECG portion of the study is uninterpretable due to  left bundle branch block.    During stress, rare PVCs are noted.    The patient reported chest pain during the stress test.    When compared to the previous study from 11/3/2021, the reversible inferolateral perfusion defect is still present.     [facility] 11/30/21:  Elecative PCI of LCX  The Ost Cx lesion was 99% stenosed with 0% stenosis post-intervention. Two overlapping RESOLUTE ALEJO [id number] stent was successfully placed.  The Prox Cx lesion was 75% stenosed with 0% stenosis post-intervention. A STENT RESOLUTE ALEJO [id number] stent was successfully placed at 12 BAIRON for 10 sec.  IVUS of LCX and LM for stent sizing  The Ost LAD lesion was 80% stenosed. patent LIMA to LAD graft in previous angiogram, graft not injected today  The estimated blood loss was <50 mL    [facility] 9/2021:  There was three vessel coronary artery disease.  The Mid LM to Dist LM lesion was 60% stenosed.  Patent CAMARGO to LAD  The Ost Cx to Prox Cx lesion was 99% stenosed.  The Prox L MOOK lesion was 75% stenosed.  The Prox R MOOK lesion was 70% stenosed.  The Dist L SFA lesion was 50% stenosed.    B/l U/S:  POOL 3/2021                 R 0.89 to 0.39 after exercise               L 1.06 to 0.77 after exercise .    Assessment & Plan:     MitraClip Evaluation  -Patient presents for MitraClip evaluation  -Needs [facility] first to look at Lcx stent  --[facility] +/- PCI, patient is a SILVIO candidate  - Anti-platelet Therapy: ASA. Stop Warfarin 3 days before  - Access: Right femoral  - Creatinine/CrCl: 2 (Patient has CKD IV)  - Allergies: No shellfish / Iodine allergy  - Pre-Hydration: NS x100 cc  - Pre-Op Med: Bendaryl 50mg pO   - All patient's questions were answered.  -The risks, benefits and alternatives of the procedure were explained to the patient.   -The risks of coronary angiography include but are not limited to: bleeding, infection, heart rhythm abnormalities, allergic reactions, kidney injury and potential need for dialysis, stroke and death.   - Should stenting be  indicated, the patient has agreed to dual anti-platelet therapy for 1-consecutive year with a drug-eluting stent and a minimum of 1-month with the use of a bare metal stent  - Additionally, pt is aware that non-compliance is likely to result in stent clotting with heart attack, heart failure, and/or death  -The risks of moderate sedation include hypotension, respiratory depression, arrhythmias, bronchospasm, and death.   - Informed consent was obtained and the  patient is agreeable to proceed with the procedure.    2. HFmrEF  -EF 48%    3. Recent fall  -Consult to Ortho for X-rays    Signed:  Pierre Mcclure MD  [facility] PGY-5    Staff attestation to follow.    I have seen the patient, reviewed the Fellow's history and physical, assessment and plan. I have personally interviewed and examined the patient and agree with the findings. Mod to severe AS. Cor angio and repeat ECHO if angioplasty.     RENETTA Garnica MD

## 2023-11-13 ENCOUNTER — HOSPITAL ENCOUNTER (EMERGENCY)
Age: 83
Discharge: HOME | End: 2023-11-13
Attending: EMERGENCY MEDICINE
Payer: MEDICARE

## 2023-11-13 VITALS
TEMPERATURE: 97.3 F | HEIGHT: 66 IN | BODY MASS INDEX: 34.55 KG/M2 | RESPIRATION RATE: 18 BRPM | HEART RATE: 70 BPM | OXYGEN SATURATION: 94 % | DIASTOLIC BLOOD PRESSURE: 81 MMHG | SYSTOLIC BLOOD PRESSURE: 150 MMHG | WEIGHT: 215 LBS

## 2023-11-13 DIAGNOSIS — E16.2 HYPOGLYCEMIA: Primary | ICD-10-CM

## 2023-11-13 LAB
ALBUMIN SERPL BCG-MCNC: 4 G/DL (ref 3.5–5.1)
ALP SERPL-CCNC: 69 U/L (ref 38–126)
ALT SERPL W/O P-5'-P-CCNC: 9 U/L (ref 11–66)
ANION GAP SERPL CALC-SCNC: 12 MEQ/L (ref 8–16)
AST SERPL-CCNC: 14 U/L (ref 5–40)
BASOPHILS ABSOLUTE: 0 THOU/MM3 (ref 0–0.1)
BASOPHILS NFR BLD AUTO: 0.5 %
BILIRUB SERPL-MCNC: < 0.2 MG/DL (ref 0.3–1.2)
BUN SERPL-MCNC: 24 MG/DL (ref 7–22)
CALCIUM SERPL-MCNC: 8.8 MG/DL (ref 8.5–10.5)
CHLORIDE SERPL-SCNC: 105 MEQ/L (ref 98–111)
CO2 SERPL-SCNC: 23 MEQ/L (ref 23–33)
CREAT SERPL-MCNC: 0.9 MG/DL (ref 0.4–1.2)
DEPRECATED RDW RBC AUTO: 44.9 FL (ref 35–45)
EOSINOPHIL NFR BLD AUTO: 1.2 %
EOSINOPHILS ABSOLUTE: 0.1 THOU/MM3 (ref 0–0.4)
ERYTHROCYTE [DISTWIDTH] IN BLOOD BY AUTOMATED COUNT: 13.2 % (ref 11.5–14.5)
GFR SERPL CREATININE-BSD FRML MDRD: > 60 ML/MIN/1.73M2
GLUCOSE BLD STRIP.AUTO-MCNC: 183 MG/DL (ref 70–108)
GLUCOSE BLD STRIP.AUTO-MCNC: 216 MG/DL (ref 70–108)
GLUCOSE SERPL-MCNC: 206 MG/DL (ref 70–108)
HCT VFR BLD AUTO: 40.7 % (ref 37–47)
HGB BLD-MCNC: 12.4 GM/DL (ref 12–16)
IMM GRANULOCYTES # BLD AUTO: 0.08 THOU/MM3 (ref 0–0.07)
IMM GRANULOCYTES NFR BLD AUTO: 0.8 %
LYMPHOCYTES ABSOLUTE: 1.7 THOU/MM3 (ref 1–4.8)
LYMPHOCYTES NFR BLD AUTO: 17.7 %
MAGNESIUM SERPL-MCNC: 1.9 MG/DL (ref 1.6–2.4)
MCH RBC QN AUTO: 28.1 PG (ref 26–33)
MCHC RBC AUTO-ENTMCNC: 30.5 GM/DL (ref 32.2–35.5)
MCV RBC AUTO: 92.3 FL (ref 81–99)
MONOCYTES ABSOLUTE: 0.6 THOU/MM3 (ref 0.4–1.3)
MONOCYTES NFR BLD AUTO: 6.7 %
NEUTROPHILS NFR BLD AUTO: 73.1 %
NRBC BLD AUTO-RTO: 0 /100 WBC
OSMOLALITY SERPL CALC.SUM OF ELEC: 289.4 MOSMOL/KG (ref 275–300)
PLATELET # BLD AUTO: 227 THOU/MM3 (ref 130–400)
PMV BLD AUTO: 11 FL (ref 9.4–12.4)
POTASSIUM SERPL-SCNC: 3.7 MEQ/L (ref 3.5–5.2)
PROT SERPL-MCNC: 6.6 G/DL (ref 6.1–8)
RBC # BLD AUTO: 4.41 MILL/MM3 (ref 4.2–5.4)
SEGMENTED NEUTROPHILS ABSOLUTE COUNT: 7.1 THOU/MM3 (ref 1.8–7.7)
SODIUM SERPL-SCNC: 140 MEQ/L (ref 135–145)
TROPONIN, HIGH SENSITIVITY: 11 NG/L (ref 0–12)
TROPONIN, HIGH SENSITIVITY: 14 NG/L (ref 0–12)
WBC # BLD AUTO: 9.7 THOU/MM3 (ref 4.8–10.8)

## 2023-11-13 PROCEDURE — 83735 ASSAY OF MAGNESIUM: CPT

## 2023-11-13 PROCEDURE — 93005 ELECTROCARDIOGRAM TRACING: CPT | Performed by: STUDENT IN AN ORGANIZED HEALTH CARE EDUCATION/TRAINING PROGRAM

## 2023-11-13 PROCEDURE — 85025 COMPLETE CBC W/AUTO DIFF WBC: CPT

## 2023-11-13 PROCEDURE — 99284 EMERGENCY DEPT VISIT MOD MDM: CPT

## 2023-11-13 PROCEDURE — 80053 COMPREHEN METABOLIC PANEL: CPT

## 2023-11-13 PROCEDURE — 84484 ASSAY OF TROPONIN QUANT: CPT

## 2023-11-13 PROCEDURE — 36415 COLL VENOUS BLD VENIPUNCTURE: CPT

## 2023-11-13 PROCEDURE — 82948 REAGENT STRIP/BLOOD GLUCOSE: CPT

## 2023-11-13 PROCEDURE — 93010 ELECTROCARDIOGRAM REPORT: CPT | Performed by: NUCLEAR MEDICINE

## 2023-11-13 ASSESSMENT — PAIN - FUNCTIONAL ASSESSMENT
PAIN_FUNCTIONAL_ASSESSMENT: NONE - DENIES PAIN

## 2023-11-13 NOTE — ED NOTES
Warm blankets applied.      Juan Manuel Cuba  11/13/23 0555 Primary Defect Width In Cm (Final Defect Size - Required For Flaps/Grafts): 1.1

## 2023-11-13 NOTE — DISCHARGE INSTRUCTIONS
Take your medication as indicated and prescribed. Check your blood sugar at minimum 2 - 3 times per day and write down the results to take to your physician    [address] [place] [address] for worsening symptoms, persistent elevated blood sugar, low blood sugar, or if you develop any concerning symptoms such as: high fever not relieved by acetaminophen (Tylenol) and/or ibuprofen (Motrin / Advil), chills, shortness of breath, chest pain, feeling of your heart fluttering or racing, persistent nausea and/or vomiting, vomiting up blood, blood in your stool, numbness, loss of consciousness, weakness or tingling in the arms or legs or change in color of the extremities, changes in mental status, persistent headache, blurry vision, loss of bladder / bowel control, unable to follow up with your physician, or other any other care or concern.

## 2023-11-13 NOTE — ED NOTES
Pt resting on cot with easy and unlabored respirations.  present at bedside. Call light within reach. Telemetry in place.      Charles Wang  11/13/23 [address] [facility] [place]  11/13/23 6502

## 2023-11-13 NOTE — ED NOTES
Additional warm blankets applied. POCT blood glucose 216. Pt denies further needs at this time.  present at bedside. Telemetry in place. Call light within reach.      Piotr Arteaga  11/13/23 [address] [place], [place] [address]  11/13/23 0506

## 2023-11-13 NOTE — ED TRIAGE NOTES
Pt presents to ED via EMS for complaint of hypoglycemia. Per EMS, pt blood sugar was 35 pta. EMS administered 1 amp of D50 and 1 mg of Glucagon en route. Pt A&O x4 on arrival but is repeating questions and speech is slurred. Per pt , Noman Lopez sounded like she was having a nightmare\" approximately 2 hours ago. POCT 183. Dr. Nicole Darnell at bedside.

## 2023-11-19 LAB
EKG ATRIAL RATE: 87 BPM
EKG P AXIS: 64 DEGREES
EKG P-R INTERVAL: 160 MS
EKG Q-T INTERVAL: 424 MS
EKG QRS DURATION: 86 MS
EKG QTC CALCULATION (BAZETT): 510 MS
EKG R AXIS: 70 DEGREES
EKG T AXIS: 100 DEGREES
EKG VENTRICULAR RATE: 87 BPM

## 2024-03-05 ENCOUNTER — HOSPITAL ENCOUNTER (INPATIENT)
Age: 84
LOS: 6 days | Discharge: HOME HEALTH | DRG: 853 | End: 2024-03-11
Attending: EMERGENCY MEDICINE | Admitting: STUDENT IN AN ORGANIZED HEALTH CARE EDUCATION/TRAINING PROGRAM
Payer: MEDICARE

## 2024-03-05 ENCOUNTER — APPOINTMENT (OUTPATIENT)
Dept: INTERVENTIONAL RADIOLOGY/VASCULAR | Age: 84
DRG: 853 | End: 2024-03-05
Payer: MEDICARE

## 2024-03-05 ENCOUNTER — APPOINTMENT (OUTPATIENT)
Dept: CT IMAGING | Age: 84
DRG: 853 | End: 2024-03-05
Payer: MEDICARE

## 2024-03-05 DIAGNOSIS — N13.8 URINARY TRACT OBSTRUCTION DUE TO KIDNEY STONE: Primary | ICD-10-CM

## 2024-03-05 DIAGNOSIS — M79.89 LEG SWELLING: ICD-10-CM

## 2024-03-05 DIAGNOSIS — N10 ACUTE PYELONEPHRITIS: ICD-10-CM

## 2024-03-05 DIAGNOSIS — N17.9 AKI (ACUTE KIDNEY INJURY) (HCC): ICD-10-CM

## 2024-03-05 DIAGNOSIS — N20.0 URINARY TRACT OBSTRUCTION DUE TO KIDNEY STONE: Primary | ICD-10-CM

## 2024-03-05 DIAGNOSIS — R00.0 SINUS TACHYCARDIA: ICD-10-CM

## 2024-03-05 PROBLEM — A41.9 SEPSIS (HCC): Status: ACTIVE | Noted: 2024-03-05

## 2024-03-05 LAB
ALBUMIN SERPL BCG-MCNC: 4 G/DL (ref 3.5–5.1)
ALP SERPL-CCNC: 67 U/L (ref 38–126)
ALT SERPL W/O P-5'-P-CCNC: 16 U/L (ref 11–66)
ANION GAP SERPL CALC-SCNC: 15 MEQ/L (ref 8–16)
ARTERIAL PATENCY WRIST A: POSITIVE
AST SERPL-CCNC: 21 U/L (ref 5–40)
BACTERIA URNS QL MICRO: ABNORMAL /HPF
BASE EXCESS BLDA CALC-SCNC: -4.2 MMOL/L (ref -2.5–2.5)
BASOPHILS ABSOLUTE: 0 THOU/MM3 (ref 0–0.1)
BASOPHILS NFR BLD AUTO: 0.2 %
BDY SITE: ABNORMAL
BILIRUB SERPL-MCNC: 0.3 MG/DL (ref 0.3–1.2)
BILIRUB UR QL STRIP.AUTO: NEGATIVE
BUN SERPL-MCNC: 29 MG/DL (ref 7–22)
CALCIUM SERPL-MCNC: 9.6 MG/DL (ref 8.5–10.5)
CASTS #/AREA URNS LPF: ABNORMAL /LPF
CASTS 2: ABNORMAL /LPF
CHARACTER UR: CLEAR
CHLORIDE SERPL-SCNC: 103 MEQ/L (ref 98–111)
CO2 SERPL-SCNC: 24 MEQ/L (ref 23–33)
COLLECTED BY:: ABNORMAL
COLOR: YELLOW
CREAT SERPL-MCNC: 1.2 MG/DL (ref 0.4–1.2)
CRYSTALS URNS MICRO: ABNORMAL
DEPRECATED RDW RBC AUTO: 43.3 FL (ref 35–45)
DEPRECATED RDW RBC AUTO: 44.4 FL (ref 35–45)
DEVICE: ABNORMAL
EOSINOPHIL NFR BLD AUTO: 0 %
EOSINOPHILS ABSOLUTE: 0 THOU/MM3 (ref 0–0.4)
EPITHELIAL CELLS, UA: ABNORMAL /HPF
ERYTHROCYTE [DISTWIDTH] IN BLOOD BY AUTOMATED COUNT: 13.2 % (ref 11.5–14.5)
ERYTHROCYTE [DISTWIDTH] IN BLOOD BY AUTOMATED COUNT: 13.2 % (ref 11.5–14.5)
FIO2 ON VENT O2 ANALYZER: 2 %
GFR SERPL CREATININE-BSD FRML MDRD: 44 ML/MIN/1.73M2
GLUCOSE BLD STRIP.AUTO-MCNC: 187 MG/DL (ref 70–108)
GLUCOSE SERPL-MCNC: 189 MG/DL (ref 70–108)
GLUCOSE UR QL STRIP.AUTO: 500 MG/DL
HCO3 BLDA-SCNC: 21 MMOL/L (ref 23–28)
HCT VFR BLD AUTO: 42.2 % (ref 37–47)
HCT VFR BLD AUTO: 42.5 % (ref 37–47)
HGB BLD-MCNC: 13 GM/DL (ref 12–16)
HGB BLD-MCNC: 13.2 GM/DL (ref 12–16)
HGB UR QL STRIP.AUTO: ABNORMAL
IMM GRANULOCYTES # BLD AUTO: 0.08 THOU/MM3 (ref 0–0.07)
IMM GRANULOCYTES NFR BLD AUTO: 0.7 %
KETONES UR QL STRIP.AUTO: 15
LACTATE SERPL-SCNC: 2 MMOL/L (ref 0.5–2)
LACTATE SERPL-SCNC: 2.4 MMOL/L (ref 0.5–2)
LACTATE SERPL-SCNC: 2.9 MMOL/L (ref 0.5–2)
LACTIC ACID, SEPSIS: 2.9 MMOL/L (ref 0.5–1.9)
LIPASE SERPL-CCNC: 31.4 U/L (ref 5.6–51.3)
LYMPHOCYTES ABSOLUTE: 0.3 THOU/MM3 (ref 1–4.8)
LYMPHOCYTES NFR BLD AUTO: 2.9 %
MAGNESIUM SERPL-MCNC: 2 MG/DL (ref 1.6–2.4)
MCH RBC QN AUTO: 27.6 PG (ref 26–33)
MCH RBC QN AUTO: 28.3 PG (ref 26–33)
MCHC RBC AUTO-ENTMCNC: 30.8 GM/DL (ref 32.2–35.5)
MCHC RBC AUTO-ENTMCNC: 31.1 GM/DL (ref 32.2–35.5)
MCV RBC AUTO: 89.6 FL (ref 81–99)
MCV RBC AUTO: 91.2 FL (ref 81–99)
MISCELLANEOUS 2: ABNORMAL
MONOCYTES ABSOLUTE: 0.2 THOU/MM3 (ref 0.4–1.3)
MONOCYTES NFR BLD AUTO: 1.7 %
NEUTROPHILS NFR BLD AUTO: 94.5 %
NITRITE UR QL STRIP: NEGATIVE
NRBC BLD AUTO-RTO: 0 /100 WBC
OSMOLALITY SERPL CALC.SUM OF ELEC: 294 MOSMOL/KG (ref 275–300)
PCO2 BLDA: 39 MMHG (ref 35–45)
PH BLDA: 7.34 [PH] (ref 7.35–7.45)
PH UR STRIP.AUTO: 6.5 [PH] (ref 5–9)
PLATELET # BLD AUTO: 153 THOU/MM3 (ref 130–400)
PLATELET # BLD AUTO: 163 THOU/MM3 (ref 130–400)
PMV BLD AUTO: 10.8 FL (ref 9.4–12.4)
PMV BLD AUTO: 10.8 FL (ref 9.4–12.4)
PO2 BLDA: 82 MMHG (ref 71–104)
POTASSIUM SERPL-SCNC: 4.6 MEQ/L (ref 3.5–5.2)
PROT SERPL-MCNC: 7.1 G/DL (ref 6.1–8)
PROT UR STRIP.AUTO-MCNC: NEGATIVE MG/DL
RBC # BLD AUTO: 4.66 MILL/MM3 (ref 4.2–5.4)
RBC # BLD AUTO: 4.71 MILL/MM3 (ref 4.2–5.4)
RBC URINE: ABNORMAL /HPF
RENAL EPI CELLS #/AREA URNS HPF: ABNORMAL /[HPF]
SAO2 % BLDA: 95 %
SEGMENTED NEUTROPHILS ABSOLUTE COUNT: 10.2 THOU/MM3 (ref 1.8–7.7)
SODIUM SERPL-SCNC: 142 MEQ/L (ref 135–145)
SP GR UR REFRACT.AUTO: 1.03 (ref 1–1.03)
TROPONIN, HIGH SENSITIVITY: 15 NG/L (ref 0–12)
UROBILINOGEN, URINE: 0.2 EU/DL (ref 0–1)
WBC # BLD AUTO: 10.8 THOU/MM3 (ref 4.8–10.8)
WBC # BLD AUTO: 8.7 THOU/MM3 (ref 4.8–10.8)
WBC #/AREA URNS HPF: ABNORMAL /HPF
WBC #/AREA URNS HPF: ABNORMAL /[HPF]
YEAST LIKE FUNGI URNS QL MICRO: ABNORMAL

## 2024-03-05 PROCEDURE — 74177 CT ABD & PELVIS W/CONTRAST: CPT

## 2024-03-05 PROCEDURE — 87801 DETECT AGNT MULT DNA AMPLI: CPT

## 2024-03-05 PROCEDURE — 2580000003 HC RX 258

## 2024-03-05 PROCEDURE — 85027 COMPLETE CBC AUTOMATED: CPT

## 2024-03-05 PROCEDURE — 99223 1ST HOSP IP/OBS HIGH 75: CPT | Performed by: STUDENT IN AN ORGANIZED HEALTH CARE EDUCATION/TRAINING PROGRAM

## 2024-03-05 PROCEDURE — 71275 CT ANGIOGRAPHY CHEST: CPT

## 2024-03-05 PROCEDURE — 94761 N-INVAS EAR/PLS OXIMETRY MLT: CPT

## 2024-03-05 PROCEDURE — 83735 ASSAY OF MAGNESIUM: CPT

## 2024-03-05 PROCEDURE — 83690 ASSAY OF LIPASE: CPT

## 2024-03-05 PROCEDURE — 87186 SC STD MICRODIL/AGAR DIL: CPT

## 2024-03-05 PROCEDURE — 87641 MR-STAPH DNA AMP PROBE: CPT

## 2024-03-05 PROCEDURE — 2700000000 HC OXYGEN THERAPY PER DAY

## 2024-03-05 PROCEDURE — 6370000000 HC RX 637 (ALT 250 FOR IP)

## 2024-03-05 PROCEDURE — 6360000002 HC RX W HCPCS

## 2024-03-05 PROCEDURE — 93010 ELECTROCARDIOGRAM REPORT: CPT | Performed by: NUCLEAR MEDICINE

## 2024-03-05 PROCEDURE — 96374 THER/PROPH/DIAG INJ IV PUSH: CPT

## 2024-03-05 PROCEDURE — 96375 TX/PRO/DX INJ NEW DRUG ADDON: CPT

## 2024-03-05 PROCEDURE — 87086 URINE CULTURE/COLONY COUNT: CPT

## 2024-03-05 PROCEDURE — 99285 EMERGENCY DEPT VISIT HI MDM: CPT

## 2024-03-05 PROCEDURE — 93005 ELECTROCARDIOGRAM TRACING: CPT | Performed by: STUDENT IN AN ORGANIZED HEALTH CARE EDUCATION/TRAINING PROGRAM

## 2024-03-05 PROCEDURE — 80053 COMPREHEN METABOLIC PANEL: CPT

## 2024-03-05 PROCEDURE — 87077 CULTURE AEROBIC IDENTIFY: CPT

## 2024-03-05 PROCEDURE — 2580000003 HC RX 258: Performed by: STUDENT IN AN ORGANIZED HEALTH CARE EDUCATION/TRAINING PROGRAM

## 2024-03-05 PROCEDURE — 82803 BLOOD GASES ANY COMBINATION: CPT

## 2024-03-05 PROCEDURE — 81001 URINALYSIS AUTO W/SCOPE: CPT

## 2024-03-05 PROCEDURE — 96365 THER/PROPH/DIAG IV INF INIT: CPT

## 2024-03-05 PROCEDURE — 6360000004 HC RX CONTRAST MEDICATION: Performed by: EMERGENCY MEDICINE

## 2024-03-05 PROCEDURE — 36415 COLL VENOUS BLD VENIPUNCTURE: CPT

## 2024-03-05 PROCEDURE — 93970 EXTREMITY STUDY: CPT

## 2024-03-05 PROCEDURE — 83605 ASSAY OF LACTIC ACID: CPT

## 2024-03-05 PROCEDURE — 2140000000 HC CCU INTERMEDIATE R&B

## 2024-03-05 PROCEDURE — 36600 WITHDRAWAL OF ARTERIAL BLOOD: CPT

## 2024-03-05 PROCEDURE — 93005 ELECTROCARDIOGRAM TRACING: CPT

## 2024-03-05 PROCEDURE — 87040 BLOOD CULTURE FOR BACTERIA: CPT

## 2024-03-05 PROCEDURE — 6360000004 HC RX CONTRAST MEDICATION: Performed by: STUDENT IN AN ORGANIZED HEALTH CARE EDUCATION/TRAINING PROGRAM

## 2024-03-05 PROCEDURE — 84484 ASSAY OF TROPONIN QUANT: CPT

## 2024-03-05 PROCEDURE — 6370000000 HC RX 637 (ALT 250 FOR IP): Performed by: STUDENT IN AN ORGANIZED HEALTH CARE EDUCATION/TRAINING PROGRAM

## 2024-03-05 PROCEDURE — 82948 REAGENT STRIP/BLOOD GLUCOSE: CPT

## 2024-03-05 PROCEDURE — 85025 COMPLETE CBC W/AUTO DIFF WBC: CPT

## 2024-03-05 RX ORDER — SENNOSIDES A AND B 8.6 MG/1
1 TABLET, FILM COATED ORAL NIGHTLY
Status: DISCONTINUED | OUTPATIENT
Start: 2024-03-06 | End: 2024-03-11 | Stop reason: HOSPADM

## 2024-03-05 RX ORDER — ONDANSETRON 2 MG/ML
4 INJECTION INTRAMUSCULAR; INTRAVENOUS ONCE
Status: COMPLETED | OUTPATIENT
Start: 2024-03-05 | End: 2024-03-05

## 2024-03-05 RX ORDER — TAMSULOSIN HYDROCHLORIDE 0.4 MG/1
0.4 CAPSULE ORAL DAILY
Status: DISCONTINUED | OUTPATIENT
Start: 2024-03-06 | End: 2024-03-11 | Stop reason: HOSPADM

## 2024-03-05 RX ORDER — DICYCLOMINE HYDROCHLORIDE 10 MG/1
20 CAPSULE ORAL
Status: DISCONTINUED | OUTPATIENT
Start: 2024-03-05 | End: 2024-03-11 | Stop reason: HOSPADM

## 2024-03-05 RX ORDER — SODIUM CHLORIDE 0.9 % (FLUSH) 0.9 %
5-40 SYRINGE (ML) INJECTION EVERY 12 HOURS SCHEDULED
Status: DISCONTINUED | OUTPATIENT
Start: 2024-03-05 | End: 2024-03-11 | Stop reason: HOSPADM

## 2024-03-05 RX ORDER — SODIUM CHLORIDE, SODIUM LACTATE, POTASSIUM CHLORIDE, CALCIUM CHLORIDE 600; 310; 30; 20 MG/100ML; MG/100ML; MG/100ML; MG/100ML
INJECTION, SOLUTION INTRAVENOUS CONTINUOUS
Status: ACTIVE | OUTPATIENT
Start: 2024-03-05 | End: 2024-03-06

## 2024-03-05 RX ORDER — PANTOPRAZOLE SODIUM 40 MG/1
40 TABLET, DELAYED RELEASE ORAL
Status: DISCONTINUED | OUTPATIENT
Start: 2024-03-06 | End: 2024-03-11 | Stop reason: HOSPADM

## 2024-03-05 RX ORDER — POLYETHYLENE GLYCOL 3350 17 G/17G
17 POWDER, FOR SOLUTION ORAL DAILY
Status: DISCONTINUED | OUTPATIENT
Start: 2024-03-05 | End: 2024-03-11 | Stop reason: HOSPADM

## 2024-03-05 RX ORDER — ATORVASTATIN CALCIUM 20 MG/1
20 TABLET, FILM COATED ORAL NIGHTLY
Status: DISCONTINUED | OUTPATIENT
Start: 2024-03-05 | End: 2024-03-11 | Stop reason: HOSPADM

## 2024-03-05 RX ORDER — KETOROLAC TROMETHAMINE 30 MG/ML
15 INJECTION, SOLUTION INTRAMUSCULAR; INTRAVENOUS EVERY 6 HOURS PRN
Status: DISPENSED | OUTPATIENT
Start: 2024-03-05 | End: 2024-03-10

## 2024-03-05 RX ORDER — DOXEPIN HYDROCHLORIDE 50 MG/1
100 CAPSULE ORAL NIGHTLY
Status: DISCONTINUED | OUTPATIENT
Start: 2024-03-05 | End: 2024-03-11 | Stop reason: HOSPADM

## 2024-03-05 RX ORDER — INSULIN LISPRO 100 [IU]/ML
0-4 INJECTION, SOLUTION INTRAVENOUS; SUBCUTANEOUS NIGHTLY
Status: DISCONTINUED | OUTPATIENT
Start: 2024-03-05 | End: 2024-03-06

## 2024-03-05 RX ORDER — DOCUSATE SODIUM 100 MG/1
100 CAPSULE, LIQUID FILLED ORAL DAILY
Status: DISCONTINUED | OUTPATIENT
Start: 2024-03-05 | End: 2024-03-11 | Stop reason: HOSPADM

## 2024-03-05 RX ORDER — ACETAMINOPHEN 325 MG/1
650 TABLET ORAL EVERY 6 HOURS PRN
Status: DISCONTINUED | OUTPATIENT
Start: 2024-03-05 | End: 2024-03-11 | Stop reason: HOSPADM

## 2024-03-05 RX ORDER — INSULIN LISPRO 100 [IU]/ML
0-8 INJECTION, SOLUTION INTRAVENOUS; SUBCUTANEOUS
Status: DISCONTINUED | OUTPATIENT
Start: 2024-03-05 | End: 2024-03-06

## 2024-03-05 RX ORDER — FENTANYL CITRATE 50 UG/ML
50 INJECTION, SOLUTION INTRAMUSCULAR; INTRAVENOUS ONCE
Status: COMPLETED | OUTPATIENT
Start: 2024-03-05 | End: 2024-03-05

## 2024-03-05 RX ORDER — LOSARTAN POTASSIUM 50 MG/1
50 TABLET ORAL NIGHTLY
Status: DISCONTINUED | OUTPATIENT
Start: 2024-03-05 | End: 2024-03-11 | Stop reason: HOSPADM

## 2024-03-05 RX ORDER — BUMETANIDE 1 MG/1
1 TABLET ORAL DAILY
Status: DISCONTINUED | OUTPATIENT
Start: 2024-03-05 | End: 2024-03-11 | Stop reason: HOSPADM

## 2024-03-05 RX ORDER — IBUPROFEN 600 MG/1
1 TABLET ORAL PRN
Status: DISCONTINUED | OUTPATIENT
Start: 2024-03-05 | End: 2024-03-11 | Stop reason: HOSPADM

## 2024-03-05 RX ORDER — DEXTROSE MONOHYDRATE 100 MG/ML
INJECTION, SOLUTION INTRAVENOUS CONTINUOUS PRN
Status: DISCONTINUED | OUTPATIENT
Start: 2024-03-05 | End: 2024-03-11 | Stop reason: HOSPADM

## 2024-03-05 RX ORDER — IPRATROPIUM BROMIDE AND ALBUTEROL SULFATE 2.5; .5 MG/3ML; MG/3ML
1 SOLUTION RESPIRATORY (INHALATION) EVERY 4 HOURS PRN
Status: DISCONTINUED | OUTPATIENT
Start: 2024-03-05 | End: 2024-03-07

## 2024-03-05 RX ORDER — POTASSIUM CHLORIDE 20 MEQ/1
20 TABLET, EXTENDED RELEASE ORAL DAILY
Status: DISCONTINUED | OUTPATIENT
Start: 2024-03-05 | End: 2024-03-11 | Stop reason: HOSPADM

## 2024-03-05 RX ORDER — SODIUM CHLORIDE 9 MG/ML
INJECTION, SOLUTION INTRAVENOUS PRN
Status: DISCONTINUED | OUTPATIENT
Start: 2024-03-05 | End: 2024-03-11 | Stop reason: HOSPADM

## 2024-03-05 RX ORDER — METOPROLOL TARTRATE 50 MG/1
50 TABLET, FILM COATED ORAL NIGHTLY
Status: DISCONTINUED | OUTPATIENT
Start: 2024-03-05 | End: 2024-03-11 | Stop reason: HOSPADM

## 2024-03-05 RX ORDER — 0.9 % SODIUM CHLORIDE 0.9 %
1000 INTRAVENOUS SOLUTION INTRAVENOUS ONCE
Status: COMPLETED | OUTPATIENT
Start: 2024-03-05 | End: 2024-03-05

## 2024-03-05 RX ORDER — ACETAMINOPHEN 650 MG/1
650 SUPPOSITORY RECTAL EVERY 6 HOURS PRN
Status: DISCONTINUED | OUTPATIENT
Start: 2024-03-05 | End: 2024-03-11 | Stop reason: HOSPADM

## 2024-03-05 RX ORDER — SODIUM CHLORIDE 0.9 % (FLUSH) 0.9 %
5-40 SYRINGE (ML) INJECTION PRN
Status: DISCONTINUED | OUTPATIENT
Start: 2024-03-05 | End: 2024-03-11 | Stop reason: HOSPADM

## 2024-03-05 RX ORDER — ONDANSETRON 2 MG/ML
INJECTION INTRAMUSCULAR; INTRAVENOUS
Status: COMPLETED
Start: 2024-03-05 | End: 2024-03-05

## 2024-03-05 RX ORDER — SODIUM PHOSPHATE,MONO-DIBASIC 19G-7G/118
1 ENEMA (ML) RECTAL ONCE
Status: COMPLETED | OUTPATIENT
Start: 2024-03-05 | End: 2024-03-05

## 2024-03-05 RX ORDER — SODIUM CHLORIDE, SODIUM LACTATE, POTASSIUM CHLORIDE, CALCIUM CHLORIDE 600; 310; 30; 20 MG/100ML; MG/100ML; MG/100ML; MG/100ML
INJECTION, SOLUTION INTRAVENOUS CONTINUOUS
Status: DISCONTINUED | OUTPATIENT
Start: 2024-03-05 | End: 2024-03-05

## 2024-03-05 RX ADMIN — IOPAMIDOL 80 ML: 755 INJECTION, SOLUTION INTRAVENOUS at 14:45

## 2024-03-05 RX ADMIN — ONDANSETRON 4 MG: 2 INJECTION INTRAMUSCULAR; INTRAVENOUS at 16:18

## 2024-03-05 RX ADMIN — SODIUM CHLORIDE 1000 ML: 9 INJECTION, SOLUTION INTRAVENOUS at 13:52

## 2024-03-05 RX ADMIN — ONDANSETRON 4 MG: 2 INJECTION INTRAMUSCULAR; INTRAVENOUS at 13:52

## 2024-03-05 RX ADMIN — SODIUM CHLORIDE, POTASSIUM CHLORIDE, SODIUM LACTATE AND CALCIUM CHLORIDE: 600; 310; 30; 20 INJECTION, SOLUTION INTRAVENOUS at 22:00

## 2024-03-05 RX ADMIN — HYDROMORPHONE HYDROCHLORIDE 0.5 MG: 1 INJECTION, SOLUTION INTRAMUSCULAR; INTRAVENOUS; SUBCUTANEOUS at 15:39

## 2024-03-05 RX ADMIN — FENTANYL CITRATE 50 MCG: 50 INJECTION INTRAMUSCULAR; INTRAVENOUS at 13:53

## 2024-03-05 RX ADMIN — ACETAMINOPHEN 650 MG: 650 SUPPOSITORY RECTAL at 21:11

## 2024-03-05 RX ADMIN — CEFTRIAXONE SODIUM 1000 MG: 1 INJECTION, POWDER, FOR SOLUTION INTRAMUSCULAR; INTRAVENOUS at 15:59

## 2024-03-05 RX ADMIN — SODIUM PHOSPHATE 1 ENEMA: 7; 19 ENEMA RECTAL at 15:36

## 2024-03-05 RX ADMIN — IOPAMIDOL 80 ML: 755 INJECTION, SOLUTION INTRAVENOUS at 20:09

## 2024-03-05 ASSESSMENT — PAIN - FUNCTIONAL ASSESSMENT: PAIN_FUNCTIONAL_ASSESSMENT: 0-10

## 2024-03-05 ASSESSMENT — LIFESTYLE VARIABLES: HOW OFTEN DO YOU HAVE A DRINK CONTAINING ALCOHOL: NEVER

## 2024-03-05 ASSESSMENT — PAIN SCALES - GENERAL
PAINLEVEL_OUTOF10: 8
PAINLEVEL_OUTOF10: 5

## 2024-03-05 NOTE — H&P
Coronal and sagittal reconstructions were obtained. All CT scans at this facility use dose modulation, iterative reconstruction, and/or weight-based dosing when appropriate to reduce radiation dose to as low as reasonably achievable. FINDINGS: Lung bases: Dependent atelectasis is present. Bilateral breast implants are partially visualized. Liver/gallbladder/bilary tree: The gallbladder is absent. No biliary ductal dilatation is identified. Hepatomegaly and hepatic steatosis are unchanged. Pancreas: Remains atrophic. Spleen : Normal. Adrenal glands: Normal. Kidneys/ ureters/ bladder: A 15 mm hyperdense cyst arising from the midpole of the left kidney is stable (series 301, image 33). A 4 mm obstructing calculus is visualized in the mid to distal left ureter (series 301, image 63). Vascular calcifications are noted within the pelvis. Mild left hydronephrosis and hydroureter is present. Left perinephric fat stranding and inflammation is observed. The urinary bladder is partially distended and grossly unremarkable. The right kidney is unremarkable. Gastrointestinal:  The appendix is normal. Colonic diverticulosis without diverticulitis is observed. No bowel obstruction, fluid collection, or free air is visualized. Retroperitoneum / lymph nodes: The aorta is not dilated. No lymphadenopathy is observed. Pelvis: No adnexal lesions are present. Musculoskeletal: Diffuse osteopenia and multilevel degenerative disc disease is observed. Curvature of the lumbar spine with right convexity is stable. The visualized skeletal structures appear intact.     1. A 4 mm obstructing calculus in the mid to distal left ureter (series 301, image 63) results in mild left hydronephrosis and hydroureter with left perinephric stranding and inflammation 2. Colonic diverticulosis without diverticulitis. Normal appendix. Numerous chronic findings are discussed. **This report has been created using voice recognition software.  It may contain minor

## 2024-03-05 NOTE — ED PROVIDER NOTES
1445)   HYDROmorphone (DILAUDID) injection 0.5 mg (0.5 mg IntraVENous Given 3/5/24 1539)   sodium phosphate (FLEET) enema 1 enema (1 enema Rectal Given 3/5/24 1536)   cefTRIAXone (ROCEPHIN) 1,000 mg in sodium chloride 0.9 % 50 mL IVPB (mini-bag) (1,000 mg IntraVENous New Bag 3/5/24 1559)   ondansetron (ZOFRAN) injection 4 mg (4 mg IntraVENous Given 3/5/24 1618)         ED Course as of 03/05/24 1644   Tue Mar 05, 2024   1443 Lactic Acid, Sepsis(!): 2.9 [JW]   1443 Troponin, High Sensitivity(!): 15 [JW]   1444 EKG 12 Lead  Sinus tachycardia, rate slightly greater than 110    QRS 74  QTc 444 [JW]   1445 Pulse(!): 118 [JW]   1508 CT ABDOMEN PELVIS W IV CONTRAST Additional Contrast? None  IMPRESSION:  1. A 4 mm obstructing calculus in the mid to distal left ureter (series 301, image 63) results in mild left hydronephrosis and hydroureter with left perinephric stranding and inflammation     2. Colonic diverticulosis without diverticulitis. Normal appendix. Numerous chronic findings are discussed.   [JW]   1614 Bacteria, UA: MANY [JW]      ED Course User Index  [JW] Audi Cloud MD         PROCEDURES: (None if blank)  Procedures:     CRITICAL CARE:  None    MEDICATION CHANGES     New Prescriptions    No medications on file         FINAL DISPOSITION   Shared Decision-Making was performed, disposition discussed with the patient/family and questions answered.    FINAL DIAGNOSES:  Final diagnoses:   Urinary tract obstruction due to kidney stone   Acute pyelonephritis       Condition: condition: good  Dispo: Admit to hospitalist  DISPOSITION Decision To Admit 03/05/2024 04:17:36 PM      This transcription was electronically signed. It was dictated by use of voice recognition software and electronically transcribed. The transcription may contain errors not detected in proofreading.

## 2024-03-06 ENCOUNTER — ANESTHESIA EVENT (OUTPATIENT)
Dept: OPERATING ROOM | Age: 84
End: 2024-03-06
Payer: MEDICARE

## 2024-03-06 ENCOUNTER — APPOINTMENT (OUTPATIENT)
Age: 84
DRG: 853 | End: 2024-03-06
Attending: STUDENT IN AN ORGANIZED HEALTH CARE EDUCATION/TRAINING PROGRAM
Payer: MEDICARE

## 2024-03-06 ENCOUNTER — ANESTHESIA (OUTPATIENT)
Dept: OPERATING ROOM | Age: 84
End: 2024-03-06
Payer: MEDICARE

## 2024-03-06 LAB
ACB COMPLEX DNA BLD POS QL NAA+NON-PROBE: NOT DETECTED
ANION GAP SERPL CALC-SCNC: 14 MEQ/L (ref 8–16)
B FRAGILIS DNA BLD POS QL NAA+NON-PROBE: NOT DETECTED
BACTERIA UR CULT: ABNORMAL
BLACTX-M ISLT/SPM QL: NOT DETECTED
BLAIMP ISLT/SPM QL: NOT DETECTED
BLAKPC ISLT/SPM QL: NOT DETECTED
BLAOXA-48-LIKE ISLT/SPM QL: NOT DETECTED
BLAVIM ISLT/SPM QL: NOT DETECTED
BOTTLE TYPE: ABNORMAL
BUN SERPL-MCNC: 32 MG/DL (ref 7–22)
C ALBICANS DNA BLD POS QL NAA+NON-PROBE: NOT DETECTED
C AURIS DNA BLD POS QL NAA+NON-PROBE: NOT DETECTED
C GATTII+NEOFOR DNA BLD POS QL NAA+N-PRB: NOT DETECTED
C GLABRATA DNA BLD POS QL NAA+NON-PROBE: NOT DETECTED
C KRUSEI DNA BLD POS QL NAA+NON-PROBE: NOT DETECTED
C PARAP DNA BLD POS QL NAA+NON-PROBE: NOT DETECTED
C TROPICLS DNA BLD POS QL NAA+NON-PROBE: NOT DETECTED
CALCIUM SERPL-MCNC: 8.8 MG/DL (ref 8.5–10.5)
CHLORIDE SERPL-SCNC: 106 MEQ/L (ref 98–111)
CO2 SERPL-SCNC: 22 MEQ/L (ref 23–33)
COAG NEG STAPH DNA BLD QL NAA+PROBE: NOT DETECTED
COLISTIN RES MCR-1 ISLT/SPM QL: NOT DETECTED
CREAT SERPL-MCNC: 1.7 MG/DL (ref 0.4–1.2)
DEPRECATED MEAN GLUCOSE BLD GHB EST-ACNC: 144 MG/DL (ref 70–126)
DEPRECATED RDW RBC AUTO: 47.2 FL (ref 35–45)
E CLOAC COMP DNA BLD POS NAA+NON-PROBE: NOT DETECTED
E COLI DNA BLD POS QL NAA+NON-PROBE: NOT DETECTED
E FAECALIS DNA BLD POS QL NAA+NON-PROBE: NOT DETECTED
E FAECIUM DNA BLD POS QL NAA+NON-PROBE: NOT DETECTED
EKG ATRIAL RATE: 116 BPM
EKG ATRIAL RATE: 121 BPM
EKG P AXIS: 57 DEGREES
EKG P AXIS: 77 DEGREES
EKG P-R INTERVAL: 152 MS
EKG P-R INTERVAL: 178 MS
EKG Q-T INTERVAL: 314 MS
EKG Q-T INTERVAL: 320 MS
EKG QRS DURATION: 74 MS
EKG QRS DURATION: 82 MS
EKG QTC CALCULATION (BAZETT): 444 MS
EKG QTC CALCULATION (BAZETT): 445 MS
EKG R AXIS: 71 DEGREES
EKG R AXIS: 77 DEGREES
EKG T AXIS: 81 DEGREES
EKG T AXIS: 97 DEGREES
EKG VENTRICULAR RATE: 116 BPM
EKG VENTRICULAR RATE: 121 BPM
ENTEROBACTERALES DNA BLD POS NAA+N-PRB: DETECTED
ERYTHROCYTE [DISTWIDTH] IN BLOOD BY AUTOMATED COUNT: 13.8 % (ref 11.5–14.5)
GFR SERPL CREATININE-BSD FRML MDRD: 29 ML/MIN/1.73M2
GLUCOSE BLD STRIP.AUTO-MCNC: 241 MG/DL (ref 70–108)
GLUCOSE BLD STRIP.AUTO-MCNC: 252 MG/DL (ref 70–108)
GLUCOSE BLD STRIP.AUTO-MCNC: 324 MG/DL (ref 70–108)
GLUCOSE SERPL-MCNC: 249 MG/DL (ref 70–108)
GP B STREP DNA SPEC QL NAA+PROBE: NOT DETECTED
GP B STREP DNA SPEC QL NAA+PROBE: NOT DETECTED
HAEM INFLU DNA BLD POS QL NAA+NON-PROBE: NOT DETECTED
HBA1C MFR BLD HPLC: 6.8 % (ref 4.4–6.4)
HCT VFR BLD AUTO: 41.1 % (ref 37–47)
HGB BLD-MCNC: 12.4 GM/DL (ref 12–16)
K OXYTOCA DNA BLD POS QL NAA+NON-PROBE: NOT DETECTED
K OXYTOCA DNA BLD POS QL NAA+NON-PROBE: NOT DETECTED
KLEBSIELLA SP DNA BLD POS QL NAA+NON-PRB: DETECTED
L MONOCYTOG DNA BLD POS QL NAA+NON-PROBE: NOT DETECTED
LACTATE SERPL-SCNC: 2.6 MMOL/L (ref 0.5–2)
LACTATE SERPL-SCNC: 2.7 MMOL/L (ref 0.5–2)
LACTATE SERPL-SCNC: 3.7 MMOL/L (ref 0.5–2)
LACTATE SERPL-SCNC: 4 MMOL/L (ref 0.5–2)
LACTATE SERPL-SCNC: 4.6 MMOL/L (ref 0.5–2)
MCH RBC QN AUTO: 28.2 PG (ref 26–33)
MCHC RBC AUTO-ENTMCNC: 30.2 GM/DL (ref 32.2–35.5)
MCV RBC AUTO: 93.6 FL (ref 81–99)
MECA ISLT/SPM QL: ABNORMAL
MECA+MECC+MREJ ISLT/SPM QL: ABNORMAL
MRSA DNA SPEC QL NAA+PROBE: NEGATIVE
N MEN DNA BLD POS QL NAA+NON-PROBE: NOT DETECTED
NDM: NOT DETECTED
ORGANISM: ABNORMAL
OSMOLALITY SERPL CALC.SUM OF ELEC: 298.4 MOSMOL/KG (ref 275–300)
P AERUGINOSA DNA BLD POS NAA+NON-PROBE: NOT DETECTED
PATHOLOGIST REVIEW: ABNORMAL
PLATELET # BLD AUTO: 183 THOU/MM3 (ref 130–400)
PMV BLD AUTO: 11.3 FL (ref 9.4–12.4)
POTASSIUM SERPL-SCNC: 4.3 MEQ/L (ref 3.5–5.2)
PROTEUS SPP: NOT DETECTED
RBC # BLD AUTO: 4.39 MILL/MM3 (ref 4.2–5.4)
S AUREUS DNA BLD POS QL NAA+NON-PROBE: NOT DETECTED
S EPIDERMIDIS DNA BLD POS QL NAA+NON-PRB: NOT DETECTED
S LUGDUNENSIS DNA BLD POS QL NAA+NON-PRB: NOT DETECTED
S MALTOPHILIA DNA BLD POS QL NAA+NON-PRB: NOT DETECTED
S MARCESCENS DNA BLD POS NAA+NON-PROBE: NOT DETECTED
S PYO DNA THROAT QL NAA+PROBE: NOT DETECTED
SALMONELLA DNA BLD POS QL NAA+NON-PROBE: NOT DETECTED
SCAN OF BLOOD SMEAR: NORMAL
SODIUM SERPL-SCNC: 142 MEQ/L (ref 135–145)
SOURCE OF BLOOD CULTURE: ABNORMAL
STREPTOCOCCUS DNA BLD QL NAA+PROBE: NOT DETECTED
VANA+VANB ISLT/SPM QL: ABNORMAL
WBC # BLD AUTO: 32.1 THOU/MM3 (ref 4.8–10.8)

## 2024-03-06 PROCEDURE — 85027 COMPLETE CBC AUTOMATED: CPT

## 2024-03-06 PROCEDURE — 2709999900 HC NON-CHARGEABLE SUPPLY: Performed by: UROLOGY

## 2024-03-06 PROCEDURE — APPNB30 APP NON BILLABLE TIME 0-30 MINS

## 2024-03-06 PROCEDURE — 80048 BASIC METABOLIC PNL TOTAL CA: CPT

## 2024-03-06 PROCEDURE — 3600000002 HC SURGERY LEVEL 2 BASE: Performed by: UROLOGY

## 2024-03-06 PROCEDURE — 2580000003 HC RX 258: Performed by: STUDENT IN AN ORGANIZED HEALTH CARE EDUCATION/TRAINING PROGRAM

## 2024-03-06 PROCEDURE — 2500000003 HC RX 250 WO HCPCS

## 2024-03-06 PROCEDURE — 93010 ELECTROCARDIOGRAM REPORT: CPT | Performed by: NUCLEAR MEDICINE

## 2024-03-06 PROCEDURE — 3700000000 HC ANESTHESIA ATTENDED CARE: Performed by: UROLOGY

## 2024-03-06 PROCEDURE — 83605 ASSAY OF LACTIC ACID: CPT

## 2024-03-06 PROCEDURE — 6370000000 HC RX 637 (ALT 250 FOR IP)

## 2024-03-06 PROCEDURE — 36415 COLL VENOUS BLD VENIPUNCTURE: CPT

## 2024-03-06 PROCEDURE — 6370000000 HC RX 637 (ALT 250 FOR IP): Performed by: STUDENT IN AN ORGANIZED HEALTH CARE EDUCATION/TRAINING PROGRAM

## 2024-03-06 PROCEDURE — 97162 PT EVAL MOD COMPLEX 30 MIN: CPT

## 2024-03-06 PROCEDURE — 6360000002 HC RX W HCPCS: Performed by: STUDENT IN AN ORGANIZED HEALTH CARE EDUCATION/TRAINING PROGRAM

## 2024-03-06 PROCEDURE — 94640 AIRWAY INHALATION TREATMENT: CPT

## 2024-03-06 PROCEDURE — 2140000000 HC CCU INTERMEDIATE R&B

## 2024-03-06 PROCEDURE — 94760 N-INVAS EAR/PLS OXIMETRY 1: CPT

## 2024-03-06 PROCEDURE — 92610 EVALUATE SWALLOWING FUNCTION: CPT

## 2024-03-06 PROCEDURE — 99233 SBSQ HOSP IP/OBS HIGH 50: CPT | Performed by: STUDENT IN AN ORGANIZED HEALTH CARE EDUCATION/TRAINING PROGRAM

## 2024-03-06 PROCEDURE — C2617 STENT, NON-COR, TEM W/O DEL: HCPCS | Performed by: UROLOGY

## 2024-03-06 PROCEDURE — 94761 N-INVAS EAR/PLS OXIMETRY MLT: CPT

## 2024-03-06 PROCEDURE — 3700000001 HC ADD 15 MINUTES (ANESTHESIA): Performed by: UROLOGY

## 2024-03-06 PROCEDURE — 51798 US URINE CAPACITY MEASURE: CPT

## 2024-03-06 PROCEDURE — 97110 THERAPEUTIC EXERCISES: CPT

## 2024-03-06 PROCEDURE — 82948 REAGENT STRIP/BLOOD GLUCOSE: CPT

## 2024-03-06 PROCEDURE — 97530 THERAPEUTIC ACTIVITIES: CPT

## 2024-03-06 PROCEDURE — 0T778DZ DILATION OF LEFT URETER WITH INTRALUMINAL DEVICE, VIA NATURAL OR ARTIFICIAL OPENING ENDOSCOPIC: ICD-10-PCS | Performed by: UROLOGY

## 2024-03-06 PROCEDURE — C1769 GUIDE WIRE: HCPCS | Performed by: UROLOGY

## 2024-03-06 PROCEDURE — 83036 HEMOGLOBIN GLYCOSYLATED A1C: CPT

## 2024-03-06 PROCEDURE — 3600000012 HC SURGERY LEVEL 2 ADDTL 15MIN: Performed by: UROLOGY

## 2024-03-06 DEVICE — URETERAL STENT
Type: IMPLANTABLE DEVICE | Status: FUNCTIONAL
Brand: PERCUFLEX™ PLUS

## 2024-03-06 RX ORDER — METOPROLOL TARTRATE 1 MG/ML
5 INJECTION, SOLUTION INTRAVENOUS ONCE
Status: COMPLETED | OUTPATIENT
Start: 2024-03-06 | End: 2024-03-06

## 2024-03-06 RX ORDER — INSULIN LISPRO 100 [IU]/ML
0-16 INJECTION, SOLUTION INTRAVENOUS; SUBCUTANEOUS
Status: DISCONTINUED | OUTPATIENT
Start: 2024-03-06 | End: 2024-03-09

## 2024-03-06 RX ORDER — SODIUM CHLORIDE, SODIUM LACTATE, POTASSIUM CHLORIDE, AND CALCIUM CHLORIDE .6; .31; .03; .02 G/100ML; G/100ML; G/100ML; G/100ML
500 INJECTION, SOLUTION INTRAVENOUS ONCE
Status: COMPLETED | OUTPATIENT
Start: 2024-03-06 | End: 2024-03-06

## 2024-03-06 RX ORDER — INSULIN GLARGINE 100 [IU]/ML
5 INJECTION, SOLUTION SUBCUTANEOUS NIGHTLY
Status: DISCONTINUED | OUTPATIENT
Start: 2024-03-06 | End: 2024-03-07

## 2024-03-06 RX ORDER — IPRATROPIUM BROMIDE AND ALBUTEROL SULFATE 2.5; .5 MG/3ML; MG/3ML
SOLUTION RESPIRATORY (INHALATION)
Status: DISPENSED
Start: 2024-03-06 | End: 2024-03-06

## 2024-03-06 RX ORDER — INSULIN LISPRO 100 [IU]/ML
0-4 INJECTION, SOLUTION INTRAVENOUS; SUBCUTANEOUS NIGHTLY
Status: DISCONTINUED | OUTPATIENT
Start: 2024-03-06 | End: 2024-03-09

## 2024-03-06 RX ORDER — PROPOFOL 10 MG/ML
INJECTION, EMULSION INTRAVENOUS PRN
Status: DISCONTINUED | OUTPATIENT
Start: 2024-03-06 | End: 2024-03-06 | Stop reason: SDUPTHER

## 2024-03-06 RX ORDER — SILDENAFIL 50 MG/1
50 TABLET, FILM COATED ORAL DAILY
COMMUNITY

## 2024-03-06 RX ADMIN — METOPROLOL TARTRATE 5 MG: 5 INJECTION INTRAVENOUS at 00:57

## 2024-03-06 RX ADMIN — DICYCLOMINE HYDROCHLORIDE 20 MG: 10 CAPSULE ORAL at 20:18

## 2024-03-06 RX ADMIN — SODIUM CHLORIDE, POTASSIUM CHLORIDE, SODIUM LACTATE AND CALCIUM CHLORIDE: 600; 310; 30; 20 INJECTION, SOLUTION INTRAVENOUS at 10:33

## 2024-03-06 RX ADMIN — PIPERACILLIN AND TAZOBACTAM 4500 MG: 4; .5 INJECTION, POWDER, FOR SOLUTION INTRAVENOUS at 01:10

## 2024-03-06 RX ADMIN — INSULIN GLARGINE 5 UNITS: 100 INJECTION, SOLUTION SUBCUTANEOUS at 20:19

## 2024-03-06 RX ADMIN — ACETAMINOPHEN 650 MG: 325 TABLET ORAL at 20:19

## 2024-03-06 RX ADMIN — PIPERACILLIN AND TAZOBACTAM 3375 MG: 3; .375 INJECTION, POWDER, LYOPHILIZED, FOR SOLUTION INTRAVENOUS at 06:11

## 2024-03-06 RX ADMIN — ACETAMINOPHEN 650 MG: 325 TABLET ORAL at 12:12

## 2024-03-06 RX ADMIN — SODIUM CHLORIDE, PRESERVATIVE FREE 10 ML: 5 INJECTION INTRAVENOUS at 20:20

## 2024-03-06 RX ADMIN — DOXEPIN HYDROCHLORIDE 100 MG: 50 CAPSULE ORAL at 20:18

## 2024-03-06 RX ADMIN — SODIUM CHLORIDE, POTASSIUM CHLORIDE, SODIUM LACTATE AND CALCIUM CHLORIDE 500 ML: 600; 310; 30; 20 INJECTION, SOLUTION INTRAVENOUS at 13:43

## 2024-03-06 RX ADMIN — SENNOSIDES 8.6 MG: 8.6 TABLET, FILM COATED ORAL at 20:19

## 2024-03-06 RX ADMIN — PIPERACILLIN AND TAZOBACTAM 3375 MG: 3; .375 INJECTION, POWDER, LYOPHILIZED, FOR SOLUTION INTRAVENOUS at 15:13

## 2024-03-06 RX ADMIN — ATORVASTATIN CALCIUM 20 MG: 20 TABLET, FILM COATED ORAL at 20:18

## 2024-03-06 RX ADMIN — METOPROLOL TARTRATE 50 MG: 50 TABLET, FILM COATED ORAL at 20:18

## 2024-03-06 RX ADMIN — TAMSULOSIN HYDROCHLORIDE 0.4 MG: 0.4 CAPSULE ORAL at 11:43

## 2024-03-06 RX ADMIN — IPRATROPIUM BROMIDE AND ALBUTEROL SULFATE 1 DOSE: .5; 3 SOLUTION RESPIRATORY (INHALATION) at 17:44

## 2024-03-06 RX ADMIN — INSULIN LISPRO 4 UNITS: 100 INJECTION, SOLUTION INTRAVENOUS; SUBCUTANEOUS at 12:13

## 2024-03-06 RX ADMIN — INSULIN LISPRO 4 UNITS: 100 INJECTION, SOLUTION INTRAVENOUS; SUBCUTANEOUS at 20:19

## 2024-03-06 RX ADMIN — PROPOFOL 50 MCG/KG/MIN: 10 INJECTION, EMULSION INTRAVENOUS at 09:19

## 2024-03-06 RX ADMIN — PROPOFOL 50 MG: 10 INJECTION, EMULSION INTRAVENOUS at 09:18

## 2024-03-06 RX ADMIN — PIPERACILLIN AND TAZOBACTAM 3375 MG: 3; .375 INJECTION, POWDER, LYOPHILIZED, FOR SOLUTION INTRAVENOUS at 22:58

## 2024-03-06 RX ADMIN — INSULIN LISPRO 4 UNITS: 100 INJECTION, SOLUTION INTRAVENOUS; SUBCUTANEOUS at 17:17

## 2024-03-06 ASSESSMENT — PAIN DESCRIPTION - LOCATION
LOCATION: NECK
LOCATION: HEAD

## 2024-03-06 ASSESSMENT — PAIN DESCRIPTION - DESCRIPTORS: DESCRIPTORS: ACHING;DISCOMFORT

## 2024-03-06 ASSESSMENT — PAIN DESCRIPTION - PAIN TYPE: TYPE: ACUTE PAIN

## 2024-03-06 ASSESSMENT — PAIN - FUNCTIONAL ASSESSMENT: PAIN_FUNCTIONAL_ASSESSMENT: ACTIVITIES ARE NOT PREVENTED

## 2024-03-06 ASSESSMENT — PAIN DESCRIPTION - ORIENTATION: ORIENTATION: MID

## 2024-03-06 ASSESSMENT — PAIN SCALES - GENERAL
PAINLEVEL_OUTOF10: 3
PAINLEVEL_OUTOF10: 5

## 2024-03-06 NOTE — PROCEDURES
Bladder scan completed and results given to Lupis GALO     41 ml of urine in bladder at this time.
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
## 2024-03-06 NOTE — OP NOTE
Maksim Noriega MD.  Urologic Surgery      [facility]    DATE: 3/6/2024  Patient:  Consuelo Raygoza  MRN: [id number]  YOB: 1940    SURGEON: Maksim Noriega MD.    ASSISTANT: none    PREOPERATIVE DIAGNOSIS:  left ureteral obstruction    POSTOPERATIVE DIAGNOSIS: left ureteral obstruction    PROCEDURE PERFORMED:  Cystoscopy, left ureteral stent placement    ANESTHESIA: Monitor Anesthesia Care    COMPLICATIONS: none    OR BLOOD LOSS:  Minimal    FLUIDS: Cystalloids per Anesthesia    SPECIMENS:  * No specimens in log *      DRAINS: 6 x 26 dbl j stent    INDICATIONS FOR PROCEDURE:  The patient is a 83 y.o. female who presents today with Generalized abdominal pain [R10.84] here for CYSTOSCOPYLEFT  URETERAL STENT INSERTION. After risks, benefits and alternatives of the procedure were discussed with the patient, the patient elected to proceed.     DETAILS OF PROCEDURE:  After informed consent was obtained in the preoperative area, the patient was taken back to the operating room and transferred to the operating table in supine position.   Anesthesia was induced and antibiotics were given.  The patient was placed in modified dorsal lithotomy position and sterilely prepped and draped in a standard fashion.  A timeout occurred.  Two patient identifiers were used.  We entered the urethra with a 22 K[place] scope.     The Left ureteral orifice was then visualized.       . A guidewire was carefully advanced into the kidney and position was confirmed with xray.    Under direct visualization the stent was advanced over the wire until it was in proper location. The Glidewire was then removed. A curl could be seen in the Left renal pelvis under using fluoroscopic vision, and in the bladder under direct visualization.     there was efflux of urine through the stent noted.          The patients bladder was drained. All instrumentation was removed. The patient was then awakened and discharged back to the PACU in good

## 2024-03-06 NOTE — CARE COORDINATION
Case Management Assessment  Initial Evaluation    Date/Time of Evaluation: 3/6/2024 2:07 PM  Assessment Completed by: Lucila Ferreira RN    If patient is discharged prior to next notation, then this note serves as note for discharge by case management.    Patient Name: Consuelo Raygoza                   YOB: 1940  Diagnosis: Sinus tachycardia [R00.0]  Acute pyelonephritis [N10]  Leg swelling [M79.89]  Urinary tract obstruction due to kidney stone [N20.0, N13.8]  Sepsis (HCC) [A41.9]                   Date / Time: 3/5/2024  1:18 PM  Location: [address] [place] [address]-     Patient Admission Status: Inpatient   Readmission Risk Low 0-14, Mod 15-19), High > 20: Readmission Risk Score: 12.7    Current PCP: Eddi Rangel MD  PCP verified by CM? Yes    Chart Reviewed: Yes      History Provided by: Patient, Spouse  Patient Orientation: Alert and Oriented    Patient Cognition: Alert    Hospitalization in the last 30 days (Readmission):  No    If yes, Readmission Assessment in  Navigator will be completed.    Advance Directives:      Code Status: Limited   Patient's Primary Decision Maker is: Patient Declined (Legal Next of Kin Remains as Decision Maker)    Primary Decision Maker: Dr Marcell Raygoza Poa - Child - [phone]    Secondary Decision Maker: Marcell Raygoza - Spouse - [phone]    Discharge Planning:    Patient lives with: Spouse/Significant Other Type of Home: House  Primary Care Giver: Self  Patient Support Systems include: Spouse/Significant Other, Children, Family Members, Other (Comment) (Private caregiver 1x/week;  2x/week)   Current Financial resources: Medicare  Current community resources: Other (Comment) (Private caregiver 1x/week;  2x/week)  Current services prior to admission: Durable Medical Equipment, Private Duty Homecare, Other (Comment) (Private caregiver 1x/week;  2x/week)            Current DME: Cane, Walker, Wheelchair, Bedside Commode, Other (Comment),

## 2024-03-06 NOTE — ANESTHESIA PRE PROCEDURE
didn't smoke at all   Substance Use Topics   • Alcohol use: Yes     Alcohol/week: 2.0 standard drinks of alcohol     Types: 2 Glasses of wine per week     Comment: occasional                                Counseling given: Not Answered  Tobacco comments: social smoker some days didn't smoke at all      Vital Signs (Current):   Vitals:    03/05/24 2030 03/05/24 2200 03/06/24 0000 03/06/24 0400   BP: (!) 137/54  103/62 (!) 107/59   Pulse: (!) 123 (!) 137 (!) 121 (!) 101   Resp: 22 26 22   Temp: 100 °F (37.8 °C)  98.9 °F (37.2 °C) 98.8 °F (37.1 °C)   TempSrc: Oral  Oral Oral   SpO2: 99% 91% 98% 98%   Weight: 96.5 kg (212 lb 11.9 oz)      Height: 1.676 m (5' 6\")                                                 BP Readings from Last 3 Encounters:   03/06/24 (!) 107/59   11/13/23 (!) 150/81   10/20/22 (!) 144/82       NPO Status:                                                                                 BMI:   Wt Readings from Last 3 Encounters:   03/05/24 96.5 kg (212 lb 11.9 oz)   11/13/23 97.5 kg (215 lb)   11/29/17 97.3 kg (214 lb 8 oz)     Body mass index is 34.34 kg/m².    CBC:   Lab Results   Component Value Date/Time    WBC 32.1 03/06/2024 04:21 AM    RBC 4.39 03/06/2024 04:21 AM    RBC 3.67 04/24/2012 09:50 AM    HGB 12.4 03/06/2024 04:21 AM    HCT 41.1 03/06/2024 04:21 AM    MCV 93.6 03/06/2024 04:21 AM    RDW 13.9 11/29/2017 04:25 PM     03/06/2024 04:21 AM       CMP:   Lab Results   Component Value Date/Time     03/06/2024 04:22 AM    K 4.3 03/06/2024 04:22 AM     03/06/2024 04:22 AM    CO2 22 03/06/2024 04:22 AM    BUN 32 03/06/2024 04:22 AM    CREATININE 1.7 03/06/2024 04:22 AM    LABGLOM 29 03/06/2024 04:22 AM    GLUCOSE 249 03/06/2024 04:22 AM    GLUCOSE 240 04/24/2012 09:50 AM    PROT 7.1 03/05/2024 01:30 PM    CALCIUM 8.8 03/06/2024 04:22 AM    BILITOT 0.3 03/05/2024 01:30 PM    ALKPHOS 67 03/05/2024 01:30 PM    AST 21 03/05/2024 01:30 PM    ALT 16 03/05/2024 01:30 PM       POC

## 2024-03-06 NOTE — ANESTHESIA POSTPROCEDURE EVALUATION
Department of Anesthesiology  Postprocedure Note    Patient: Consuelo Raygoza  MRN: [id number]  YOB: 1940  Date of evaluation: 3/6/2024    Procedure Summary       Date: 03/06/24 Room / Location: [facility]  / [facility] OR    Anesthesia Start: 0912 Anesthesia Stop: 0937    Procedure: CYSTOSCOPYLEFT  URETERAL STENT INSERTION (Left) Diagnosis:       Generalized abdominal pain      (Generalized abdominal pain [R10.84])    Surgeons: Maksim Noriega MD Responsible Provider: Ulisses Yang DO    Anesthesia Type: MAC ASA Status: 4            Anesthesia Type: MAC    Elisa Phase I:      Elisa Phase II:      Anesthesia Post Evaluation    Patient location during evaluation: PACU  Patient participation: complete - patient participated  Level of consciousness: awake and alert  Airway patency: patent  Nausea & Vomiting: no vomiting and no nausea  Cardiovascular status: hemodynamically stable  Respiratory status: acceptable  Hydration status: stable  Pain management: adequate    No notable events documented.

## 2024-03-06 NOTE — PLAN OF CARE
Problem: Discharge Planning  Goal: Discharge to home or other facility with appropriate resources  3/6/2024 0042 by Kennedi Starr RN  Outcome: Progressing     Problem: Safety - Adult  Goal: Free from fall injury  3/6/2024 0042 by Kennedi Starr RN  Outcome: Progressing     Problem: Infection - Adult  Goal: Absence of infection during hospitalization  3/6/2024 0042 by Kennedi Starr RN  Outcome: Progressing   Care plan reviewed with patient and verbalize understanding of the plan of care and contribute to goal setting.

## 2024-03-06 NOTE — CONSULTS
[facility]  [facility]  [address] [facility] [address]  Dept: [phone]  Loc: [phone]  Visit Date: 3/5/2024    Urology Consult Note    Reason for Consult:  4 mm obstructing infected stone  Requesting Physician:  Dr. Audi Cloud    History Obtained From:  patient, electronic medical record    Chief Complaint: Abdominal Pain, nausea, and vomiting    HISTORY OF PRESENT ILLNESS:      Ms. Raygoza is an 83-year-old female that presented to [facility] on 3/5/2024 with abdominal pain, nausea, and vomiting. CT abdomen and pelvis obtained, remarkable for a 4 mm obstructing stone in the mid to distal L ureter resulting in mild left hydronephrosis and hydroureter with perinephric stranding and inflammation. Urine micro at admission suggestive of infection with moderate leukocytes, 10-15 RBC, 25-50 WBC, and many bacteria. WBC within normal limits and hemoglobin unremarkable at 13.2. Creatinine near patient's baseline at 1.2. Concern for sepsis given elevated lactic acid, tachycardia, and tachypnea noted at presentation.     Patient is s/p cystoscopy, L ureteral stent insertion with Dr. Maksim Noriega on 3/6/2024. Post-operatively, the patient is feeling well and denies fever. Does note some gross hematuria.     Medical hx remarkable for acute hypoxic respiratory failure, unilateral RLE swelling, insulin-dependent type 2 diabetes, HTN, HLD, insomnia, GERD, and obesity.     Past Medical History:        Diagnosis Date    Cancer (HCC) 1990    breast    History of blood transfusion     Hyperlipidemia     Hypertension     Type II or unspecified type diabetes mellitus without mention of complication, not stated as uncontrolled      Past Surgical History:        Procedure Laterality Date    BREAST SURGERY      bilateral mastectomy; cancer right breast    CHOLECYSTECTOMY      COLONOSCOPY      COSMETIC SURGERY      breast reconstruction; eyelids lifted    ENDOSCOPY, COLON, DIAGNOSTIC      EYE

## 2024-03-06 NOTE — BRIEF OP NOTE
Brief Postoperative Note      Patient: Consuelo Raygoza  YOB: 1940  MRN: [id number]    Date of Procedure: 3/6/2024    Pre-Op Diagnosis Codes:     * Generalized abdominal pain [R10.84]    Post-Op Diagnosis: Same       Procedure(s):  CYSTOSCOPYLEFT  URETERAL STENT INSERTION    Surgeon(s):  Maksim Noriega MD    Assistant:  * No surgical staff found *    Anesthesia: Monitor Anesthesia Care    Estimated Blood Loss (mL): Minimal    Complications: None    Specimens:   * No specimens in log *    Implants:  Implant Name Type Inv. Item Serial No.  Lot No. LRB No. Used Action   STENT URET 6FR L26CM HYDR+ PGTL TAPR TIP GRAD BLDR MRK LO - [id number]  STENT URET 6FR L26CM HYDR+ PGTL TAPR TIP GRAD BLDR MRK LO  [employer] UROLOGY- [id number] Left 1 Implanted         Drains:   External Urinary Catheter (Active)   Site Assessment Clean,dry & intact 03/06/24 0642   Placement Replaced 03/06/24 0642   Catheter Care Catheter/Wick replaced 03/06/24 0642   Perineal Care Yes 03/06/24 0642   Suction 40 mmgHg continuous 03/06/24 0642   Urine Color Ara 03/06/24 0642   Urine Appearance Cloudy 03/06/24 0642   Urine Odor Malodorous 03/05/24 2030       Findings: stent placed. Needs treatment in about two weeks      Electronically signed by MAKSIM NORIEGA MD on 3/6/2024 at 12:05 PM

## 2024-03-07 ENCOUNTER — TELEPHONE (OUTPATIENT)
Dept: UROLOGY | Age: 84
End: 2024-03-07

## 2024-03-07 ENCOUNTER — APPOINTMENT (OUTPATIENT)
Age: 84
DRG: 853 | End: 2024-03-07
Attending: STUDENT IN AN ORGANIZED HEALTH CARE EDUCATION/TRAINING PROGRAM
Payer: MEDICARE

## 2024-03-07 DIAGNOSIS — N20.1 LEFT URETERAL CALCULUS: Primary | ICD-10-CM

## 2024-03-07 PROBLEM — N20.0 URINARY TRACT OBSTRUCTION DUE TO KIDNEY STONE: Status: ACTIVE | Noted: 2024-03-07

## 2024-03-07 PROBLEM — M79.89 LEG SWELLING: Status: ACTIVE | Noted: 2024-03-07

## 2024-03-07 PROBLEM — N17.9 AKI (ACUTE KIDNEY INJURY) (HCC): Status: ACTIVE | Noted: 2024-03-07

## 2024-03-07 PROBLEM — N13.8 URINARY TRACT OBSTRUCTION DUE TO KIDNEY STONE: Status: ACTIVE | Noted: 2024-03-07

## 2024-03-07 LAB
ANION GAP SERPL CALC-SCNC: 12 MEQ/L (ref 8–16)
BUN SERPL-MCNC: 33 MG/DL (ref 7–22)
CALCIUM SERPL-MCNC: 8.5 MG/DL (ref 8.5–10.5)
CHLORIDE SERPL-SCNC: 106 MEQ/L (ref 98–111)
CO2 SERPL-SCNC: 22 MEQ/L (ref 23–33)
CREAT SERPL-MCNC: 1.3 MG/DL (ref 0.4–1.2)
DEPRECATED RDW RBC AUTO: 46.6 FL (ref 35–45)
ECHO AV CUSP MM: 1.6 CM
ECHO AV PEAK GRADIENT: 5 MMHG
ECHO AV PEAK VELOCITY: 1.1 M/S
ECHO AV VELOCITY RATIO: 0.73
ECHO BSA: 2.12 M2
ECHO EST RA PRESSURE: 5 MMHG
ECHO LA DIAMETER INDEX: 1.41 CM/M2
ECHO LA DIAMETER: 2.9 CM
ECHO LV FRACTIONAL SHORTENING: 28 % (ref 28–44)
ECHO LV INTERNAL DIMENSION DIASTOLE INDEX: 2.1 CM/M2
ECHO LV INTERNAL DIMENSION DIASTOLIC: 4.3 CM (ref 3.9–5.3)
ECHO LV INTERNAL DIMENSION SYSTOLIC INDEX: 1.51 CM/M2
ECHO LV INTERNAL DIMENSION SYSTOLIC: 3.1 CM
ECHO LV ISOVOLUMETRIC RELAXATION TIME (IVRT): 123 MS
ECHO LV IVSD: 1 CM (ref 0.6–0.9)
ECHO LV MASS 2D: 142.5 G (ref 67–162)
ECHO LV MASS INDEX 2D: 69.5 G/M2 (ref 43–95)
ECHO LV POSTERIOR WALL DIASTOLIC: 1 CM (ref 0.6–0.9)
ECHO LV RELATIVE WALL THICKNESS RATIO: 0.47
ECHO LVOT PEAK GRADIENT: 3 MMHG
ECHO LVOT PEAK VELOCITY: 0.8 M/S
ECHO MV A VELOCITY: 1.19 M/S
ECHO MV E DECELERATION TIME (DT): 180 MS
ECHO MV E VELOCITY: 1.21 M/S
ECHO MV E/A RATIO: 1.02
ECHO MV REGURGITANT PEAK GRADIENT: 49 MMHG
ECHO MV REGURGITANT PEAK VELOCITY: 3.5 M/S
ECHO PV MAX VELOCITY: 0.6 M/S
ECHO PV PEAK GRADIENT: 1 MMHG
ECHO RIGHT VENTRICULAR SYSTOLIC PRESSURE (RVSP): 49 MMHG
ECHO RV INTERNAL DIMENSION: 2.3 CM
ECHO TV E WAVE: 0.8 M/S
ECHO TV REGURGITANT MAX VELOCITY: 3.31 M/S
ECHO TV REGURGITANT PEAK GRADIENT: 44 MMHG
ERYTHROCYTE [DISTWIDTH] IN BLOOD BY AUTOMATED COUNT: 13.7 % (ref 11.5–14.5)
GFR SERPL CREATININE-BSD FRML MDRD: 40 ML/MIN/1.73M2
GLUCOSE BLD STRIP.AUTO-MCNC: 233 MG/DL (ref 70–108)
GLUCOSE BLD STRIP.AUTO-MCNC: 263 MG/DL (ref 70–108)
GLUCOSE BLD STRIP.AUTO-MCNC: 284 MG/DL (ref 70–108)
GLUCOSE BLD STRIP.AUTO-MCNC: 363 MG/DL (ref 70–108)
GLUCOSE SERPL-MCNC: 221 MG/DL (ref 70–108)
HCT VFR BLD AUTO: 37.2 % (ref 37–47)
HGB BLD-MCNC: 11.4 GM/DL (ref 12–16)
LACTATE SERPL-SCNC: 1.3 MMOL/L (ref 0.5–2)
LACTATE SERPL-SCNC: 1.7 MMOL/L (ref 0.5–2)
MCH RBC QN AUTO: 28.2 PG (ref 26–33)
MCHC RBC AUTO-ENTMCNC: 30.6 GM/DL (ref 32.2–35.5)
MCV RBC AUTO: 92.1 FL (ref 81–99)
ORGANISM: ABNORMAL
PLATELET # BLD AUTO: 148 THOU/MM3 (ref 130–400)
PMV BLD AUTO: 11.7 FL (ref 9.4–12.4)
POTASSIUM SERPL-SCNC: 3.8 MEQ/L (ref 3.5–5.2)
RBC # BLD AUTO: 4.04 MILL/MM3 (ref 4.2–5.4)
SODIUM SERPL-SCNC: 140 MEQ/L (ref 135–145)
WBC # BLD AUTO: 18.3 THOU/MM3 (ref 4.8–10.8)

## 2024-03-07 PROCEDURE — 93306 TTE W/DOPPLER COMPLETE: CPT

## 2024-03-07 PROCEDURE — 99233 SBSQ HOSP IP/OBS HIGH 50: CPT | Performed by: STUDENT IN AN ORGANIZED HEALTH CARE EDUCATION/TRAINING PROGRAM

## 2024-03-07 PROCEDURE — 93306 TTE W/DOPPLER COMPLETE: CPT | Performed by: INTERNAL MEDICINE

## 2024-03-07 PROCEDURE — 6360000002 HC RX W HCPCS: Performed by: PHYSICIAN ASSISTANT

## 2024-03-07 PROCEDURE — 99232 SBSQ HOSP IP/OBS MODERATE 35: CPT

## 2024-03-07 PROCEDURE — 6370000000 HC RX 637 (ALT 250 FOR IP)

## 2024-03-07 PROCEDURE — 92523 SPEECH SOUND LANG COMPREHEN: CPT

## 2024-03-07 PROCEDURE — 2580000003 HC RX 258: Performed by: STUDENT IN AN ORGANIZED HEALTH CARE EDUCATION/TRAINING PROGRAM

## 2024-03-07 PROCEDURE — 94640 AIRWAY INHALATION TREATMENT: CPT

## 2024-03-07 PROCEDURE — 2140000000 HC CCU INTERMEDIATE R&B

## 2024-03-07 PROCEDURE — 97530 THERAPEUTIC ACTIVITIES: CPT

## 2024-03-07 PROCEDURE — 85027 COMPLETE CBC AUTOMATED: CPT

## 2024-03-07 PROCEDURE — 97166 OT EVAL MOD COMPLEX 45 MIN: CPT

## 2024-03-07 PROCEDURE — 6360000002 HC RX W HCPCS: Performed by: STUDENT IN AN ORGANIZED HEALTH CARE EDUCATION/TRAINING PROGRAM

## 2024-03-07 PROCEDURE — 36415 COLL VENOUS BLD VENIPUNCTURE: CPT

## 2024-03-07 PROCEDURE — 80048 BASIC METABOLIC PNL TOTAL CA: CPT

## 2024-03-07 PROCEDURE — 6370000000 HC RX 637 (ALT 250 FOR IP): Performed by: STUDENT IN AN ORGANIZED HEALTH CARE EDUCATION/TRAINING PROGRAM

## 2024-03-07 PROCEDURE — 94760 N-INVAS EAR/PLS OXIMETRY 1: CPT

## 2024-03-07 PROCEDURE — 82948 REAGENT STRIP/BLOOD GLUCOSE: CPT

## 2024-03-07 PROCEDURE — 83605 ASSAY OF LACTIC ACID: CPT

## 2024-03-07 PROCEDURE — 97116 GAIT TRAINING THERAPY: CPT

## 2024-03-07 RX ORDER — IPRATROPIUM BROMIDE AND ALBUTEROL SULFATE 2.5; .5 MG/3ML; MG/3ML
1 SOLUTION RESPIRATORY (INHALATION) 2 TIMES DAILY
Status: DISCONTINUED | OUTPATIENT
Start: 2024-03-07 | End: 2024-03-11 | Stop reason: HOSPADM

## 2024-03-07 RX ORDER — INSULIN GLARGINE 100 [IU]/ML
10 INJECTION, SOLUTION SUBCUTANEOUS NIGHTLY
Status: DISCONTINUED | OUTPATIENT
Start: 2024-03-07 | End: 2024-03-11 | Stop reason: HOSPADM

## 2024-03-07 RX ORDER — ENOXAPARIN SODIUM 100 MG/ML
40 INJECTION SUBCUTANEOUS DAILY
Status: DISCONTINUED | OUTPATIENT
Start: 2024-03-07 | End: 2024-03-11 | Stop reason: HOSPADM

## 2024-03-07 RX ORDER — QUETIAPINE FUMARATE 25 MG/1
25 TABLET, FILM COATED ORAL NIGHTLY
Status: DISCONTINUED | OUTPATIENT
Start: 2024-03-07 | End: 2024-03-11 | Stop reason: HOSPADM

## 2024-03-07 RX ORDER — DROPERIDOL 2.5 MG/ML
0.62 INJECTION, SOLUTION INTRAMUSCULAR; INTRAVENOUS ONCE
Status: COMPLETED | OUTPATIENT
Start: 2024-03-07 | End: 2024-03-07

## 2024-03-07 RX ORDER — ALPRAZOLAM 0.25 MG/1
0.25 TABLET ORAL ONCE
Status: COMPLETED | OUTPATIENT
Start: 2024-03-07 | End: 2024-03-07

## 2024-03-07 RX ADMIN — INSULIN LISPRO 8 UNITS: 100 INJECTION, SOLUTION INTRAVENOUS; SUBCUTANEOUS at 17:11

## 2024-03-07 RX ADMIN — SENNOSIDES 8.6 MG: 8.6 TABLET, FILM COATED ORAL at 20:40

## 2024-03-07 RX ADMIN — INSULIN GLARGINE 10 UNITS: 100 INJECTION, SOLUTION SUBCUTANEOUS at 20:51

## 2024-03-07 RX ADMIN — DICYCLOMINE HYDROCHLORIDE 20 MG: 10 CAPSULE ORAL at 20:40

## 2024-03-07 RX ADMIN — DOXEPIN HYDROCHLORIDE 100 MG: 50 CAPSULE ORAL at 20:40

## 2024-03-07 RX ADMIN — IPRATROPIUM BROMIDE AND ALBUTEROL SULFATE 1 DOSE: .5; 3 SOLUTION RESPIRATORY (INHALATION) at 18:34

## 2024-03-07 RX ADMIN — POLYETHYLENE GLYCOL 3350 17 G: 17 POWDER, FOR SOLUTION ORAL at 08:18

## 2024-03-07 RX ADMIN — ACETAMINOPHEN 650 MG: 325 TABLET ORAL at 02:26

## 2024-03-07 RX ADMIN — DICYCLOMINE HYDROCHLORIDE 20 MG: 10 CAPSULE ORAL at 16:17

## 2024-03-07 RX ADMIN — ENOXAPARIN SODIUM 40 MG: 100 INJECTION SUBCUTANEOUS at 20:40

## 2024-03-07 RX ADMIN — PIPERACILLIN AND TAZOBACTAM 3375 MG: 3; .375 INJECTION, POWDER, LYOPHILIZED, FOR SOLUTION INTRAVENOUS at 06:41

## 2024-03-07 RX ADMIN — IPRATROPIUM BROMIDE AND ALBUTEROL SULFATE 1 DOSE: .5; 3 SOLUTION RESPIRATORY (INHALATION) at 08:55

## 2024-03-07 RX ADMIN — SODIUM CHLORIDE, PRESERVATIVE FREE 10 ML: 5 INJECTION INTRAVENOUS at 20:44

## 2024-03-07 RX ADMIN — TAMSULOSIN HYDROCHLORIDE 0.4 MG: 0.4 CAPSULE ORAL at 08:18

## 2024-03-07 RX ADMIN — ACETAMINOPHEN 650 MG: 325 TABLET ORAL at 16:25

## 2024-03-07 RX ADMIN — INSULIN LISPRO 4 UNITS: 100 INJECTION, SOLUTION INTRAVENOUS; SUBCUTANEOUS at 20:56

## 2024-03-07 RX ADMIN — CEFTRIAXONE SODIUM 2000 MG: 2 INJECTION, POWDER, FOR SOLUTION INTRAMUSCULAR; INTRAVENOUS at 11:53

## 2024-03-07 RX ADMIN — QUETIAPINE FUMARATE 25 MG: 25 TABLET ORAL at 23:15

## 2024-03-07 RX ADMIN — ATORVASTATIN CALCIUM 20 MG: 20 TABLET, FILM COATED ORAL at 20:40

## 2024-03-07 RX ADMIN — ALPRAZOLAM 0.25 MG: 0.25 TABLET ORAL at 16:15

## 2024-03-07 RX ADMIN — DROPERIDOL 0.62 MG: 2.5 INJECTION, SOLUTION INTRAMUSCULAR; INTRAVENOUS at 02:26

## 2024-03-07 RX ADMIN — INSULIN LISPRO 4 UNITS: 100 INJECTION, SOLUTION INTRAVENOUS; SUBCUTANEOUS at 08:18

## 2024-03-07 RX ADMIN — DICYCLOMINE HYDROCHLORIDE 20 MG: 10 CAPSULE ORAL at 08:18

## 2024-03-07 RX ADMIN — METOPROLOL TARTRATE 50 MG: 50 TABLET, FILM COATED ORAL at 20:40

## 2024-03-07 RX ADMIN — DOCUSATE SODIUM 100 MG: 100 CAPSULE, LIQUID FILLED ORAL at 08:18

## 2024-03-07 RX ADMIN — INSULIN LISPRO 8 UNITS: 100 INJECTION, SOLUTION INTRAVENOUS; SUBCUTANEOUS at 12:31

## 2024-03-07 RX ADMIN — PANTOPRAZOLE SODIUM 40 MG: 40 TABLET, DELAYED RELEASE ORAL at 08:18

## 2024-03-07 RX ADMIN — DICYCLOMINE HYDROCHLORIDE 20 MG: 10 CAPSULE ORAL at 11:47

## 2024-03-07 ASSESSMENT — PAIN DESCRIPTION - FREQUENCY: FREQUENCY: INTERMITTENT

## 2024-03-07 ASSESSMENT — PAIN DESCRIPTION - ONSET: ONSET: ON-GOING

## 2024-03-07 ASSESSMENT — PAIN DESCRIPTION - LOCATION
LOCATION: GENERALIZED
LOCATION: HEAD

## 2024-03-07 ASSESSMENT — PAIN DESCRIPTION - DESCRIPTORS
DESCRIPTORS: DISCOMFORT
DESCRIPTORS: DISCOMFORT

## 2024-03-07 ASSESSMENT — PAIN SCALES - GENERAL
PAINLEVEL_OUTOF10: 3
PAINLEVEL_OUTOF10: 3

## 2024-03-07 ASSESSMENT — PAIN DESCRIPTION - ORIENTATION: ORIENTATION: MID

## 2024-03-07 ASSESSMENT — PAIN DESCRIPTION - PAIN TYPE: TYPE: ACUTE PAIN

## 2024-03-07 ASSESSMENT — PAIN - FUNCTIONAL ASSESSMENT: PAIN_FUNCTIONAL_ASSESSMENT: ACTIVITIES ARE NOT PREVENTED

## 2024-03-07 NOTE — TELEPHONE ENCOUNTER
Patient will need scheduled for Cystoscopy, Left Ureteroscopy, Laser Lithotripsy, Basket Retrieval of Stone Fragments, Left Ureteral Stent Exchange in the upcoming weeks (~2).

## 2024-03-07 NOTE — PLAN OF CARE
Problem: Discharge Planning  Goal: Discharge to home or other facility with appropriate resources  Outcome: Progressing  Flowsheets (Taken 3/7/2024 1032)  Discharge to home or other facility with appropriate resources:   Identify barriers to discharge with patient and caregiver   Arrange for needed discharge resources and transportation as appropriate   Identify discharge learning needs (meds, wound care, etc)     Problem: Safety - Adult  Goal: Free from fall injury  Outcome: Progressing  Flowsheets (Taken 3/7/2024 1032)  Free From Fall Injury: Instruct family/caregiver on patient safety     Problem: Infection - Adult  Goal: Absence of infection during hospitalization  Outcome: Progressing  Flowsheets (Taken 3/7/2024 1032)  Absence of infection during hospitalization:   Assess and monitor for signs and symptoms of infection   Monitor lab/diagnostic results     Problem: Chronic Conditions and Co-morbidities  Goal: Patient's chronic conditions and co-morbidity symptoms are monitored and maintained or improved  Outcome: Progressing  Flowsheets (Taken 3/7/2024 1032)  Care Plan - Patient's Chronic Conditions and Co-Morbidity Symptoms are Monitored and Maintained or Improved: Monitor and assess patient's chronic conditions and comorbid symptoms for stability, deterioration, or improvement     Problem: Pain  Goal: Verbalizes/displays adequate comfort level or baseline comfort level  Outcome: Progressing  Flowsheets (Taken 3/7/2024 1032)  Verbalizes/displays adequate comfort level or baseline comfort level:   Assess pain using appropriate pain scale   Encourage patient to monitor pain and request assistance   Care plan reviewed with patient.  Patient verbalizes understanding of the care plan and contributed to goal setting.

## 2024-03-08 LAB
ANION GAP SERPL CALC-SCNC: 15 MEQ/L (ref 8–16)
BUN SERPL-MCNC: 23 MG/DL (ref 7–22)
CALCIUM SERPL-MCNC: 9.5 MG/DL (ref 8.5–10.5)
CHLORIDE SERPL-SCNC: 105 MEQ/L (ref 98–111)
CO2 SERPL-SCNC: 22 MEQ/L (ref 23–33)
CREAT SERPL-MCNC: 1 MG/DL (ref 0.4–1.2)
DEPRECATED RDW RBC AUTO: 46.3 FL (ref 35–45)
ERYTHROCYTE [DISTWIDTH] IN BLOOD BY AUTOMATED COUNT: 13.7 % (ref 11.5–14.5)
GFR SERPL CREATININE-BSD FRML MDRD: 55 ML/MIN/1.73M2
GLUCOSE BLD STRIP.AUTO-MCNC: 136 MG/DL (ref 70–108)
GLUCOSE BLD STRIP.AUTO-MCNC: 183 MG/DL (ref 70–108)
GLUCOSE BLD STRIP.AUTO-MCNC: 222 MG/DL (ref 70–108)
GLUCOSE BLD STRIP.AUTO-MCNC: 226 MG/DL (ref 70–108)
GLUCOSE BLD STRIP.AUTO-MCNC: 260 MG/DL (ref 70–108)
GLUCOSE BLD STRIP.AUTO-MCNC: 267 MG/DL (ref 70–108)
GLUCOSE SERPL-MCNC: 233 MG/DL (ref 70–108)
HCT VFR BLD AUTO: 38.2 % (ref 37–47)
HGB BLD-MCNC: 11.9 GM/DL (ref 12–16)
MCH RBC QN AUTO: 28.4 PG (ref 26–33)
MCHC RBC AUTO-ENTMCNC: 31.2 GM/DL (ref 32.2–35.5)
MCV RBC AUTO: 91.2 FL (ref 81–99)
PLATELET # BLD AUTO: 149 THOU/MM3 (ref 130–400)
PMV BLD AUTO: 12.1 FL (ref 9.4–12.4)
POTASSIUM SERPL-SCNC: 4.2 MEQ/L (ref 3.5–5.2)
RBC # BLD AUTO: 4.19 MILL/MM3 (ref 4.2–5.4)
SODIUM SERPL-SCNC: 142 MEQ/L (ref 135–145)
WBC # BLD AUTO: 14.7 THOU/MM3 (ref 4.8–10.8)

## 2024-03-08 PROCEDURE — 6370000000 HC RX 637 (ALT 250 FOR IP)

## 2024-03-08 PROCEDURE — 6370000000 HC RX 637 (ALT 250 FOR IP): Performed by: STUDENT IN AN ORGANIZED HEALTH CARE EDUCATION/TRAINING PROGRAM

## 2024-03-08 PROCEDURE — 6360000002 HC RX W HCPCS: Performed by: STUDENT IN AN ORGANIZED HEALTH CARE EDUCATION/TRAINING PROGRAM

## 2024-03-08 PROCEDURE — 82948 REAGENT STRIP/BLOOD GLUCOSE: CPT

## 2024-03-08 PROCEDURE — 36415 COLL VENOUS BLD VENIPUNCTURE: CPT

## 2024-03-08 PROCEDURE — 6360000002 HC RX W HCPCS

## 2024-03-08 PROCEDURE — 97110 THERAPEUTIC EXERCISES: CPT

## 2024-03-08 PROCEDURE — 97116 GAIT TRAINING THERAPY: CPT

## 2024-03-08 PROCEDURE — 85027 COMPLETE CBC AUTOMATED: CPT

## 2024-03-08 PROCEDURE — 97530 THERAPEUTIC ACTIVITIES: CPT

## 2024-03-08 PROCEDURE — 87040 BLOOD CULTURE FOR BACTERIA: CPT

## 2024-03-08 PROCEDURE — 94761 N-INVAS EAR/PLS OXIMETRY MLT: CPT

## 2024-03-08 PROCEDURE — 80048 BASIC METABOLIC PNL TOTAL CA: CPT

## 2024-03-08 PROCEDURE — 2580000003 HC RX 258: Performed by: STUDENT IN AN ORGANIZED HEALTH CARE EDUCATION/TRAINING PROGRAM

## 2024-03-08 PROCEDURE — 99232 SBSQ HOSP IP/OBS MODERATE 35: CPT | Performed by: STUDENT IN AN ORGANIZED HEALTH CARE EDUCATION/TRAINING PROGRAM

## 2024-03-08 PROCEDURE — 2140000000 HC CCU INTERMEDIATE R&B

## 2024-03-08 PROCEDURE — 94640 AIRWAY INHALATION TREATMENT: CPT

## 2024-03-08 RX ORDER — HYDROXYZINE HYDROCHLORIDE 25 MG/1
25 TABLET, FILM COATED ORAL 3 TIMES DAILY PRN
Status: DISCONTINUED | OUTPATIENT
Start: 2024-03-08 | End: 2024-03-11 | Stop reason: HOSPADM

## 2024-03-08 RX ADMIN — IPRATROPIUM BROMIDE AND ALBUTEROL SULFATE 1 DOSE: .5; 3 SOLUTION RESPIRATORY (INHALATION) at 18:05

## 2024-03-08 RX ADMIN — DOCUSATE SODIUM 100 MG: 100 CAPSULE, LIQUID FILLED ORAL at 09:41

## 2024-03-08 RX ADMIN — HYDROXYZINE HYDROCHLORIDE 25 MG: 25 TABLET, FILM COATED ORAL at 17:13

## 2024-03-08 RX ADMIN — DOXEPIN HYDROCHLORIDE 100 MG: 50 CAPSULE ORAL at 19:44

## 2024-03-08 RX ADMIN — ACETAMINOPHEN 650 MG: 325 TABLET ORAL at 09:49

## 2024-03-08 RX ADMIN — ENOXAPARIN SODIUM 40 MG: 100 INJECTION SUBCUTANEOUS at 19:44

## 2024-03-08 RX ADMIN — DICYCLOMINE HYDROCHLORIDE 20 MG: 10 CAPSULE ORAL at 12:29

## 2024-03-08 RX ADMIN — INSULIN GLARGINE 10 UNITS: 100 INJECTION, SOLUTION SUBCUTANEOUS at 22:02

## 2024-03-08 RX ADMIN — SODIUM CHLORIDE, PRESERVATIVE FREE 10 ML: 5 INJECTION INTRAVENOUS at 09:42

## 2024-03-08 RX ADMIN — IPRATROPIUM BROMIDE AND ALBUTEROL SULFATE 1 DOSE: .5; 3 SOLUTION RESPIRATORY (INHALATION) at 10:00

## 2024-03-08 RX ADMIN — METOPROLOL TARTRATE 50 MG: 50 TABLET, FILM COATED ORAL at 19:44

## 2024-03-08 RX ADMIN — QUETIAPINE FUMARATE 25 MG: 25 TABLET ORAL at 22:02

## 2024-03-08 RX ADMIN — IPRATROPIUM BROMIDE 0.5 MG: 0.5 SOLUTION RESPIRATORY (INHALATION) at 01:41

## 2024-03-08 RX ADMIN — CEFTRIAXONE SODIUM 2000 MG: 2 INJECTION, POWDER, FOR SOLUTION INTRAMUSCULAR; INTRAVENOUS at 12:30

## 2024-03-08 RX ADMIN — DICYCLOMINE HYDROCHLORIDE 20 MG: 10 CAPSULE ORAL at 09:41

## 2024-03-08 RX ADMIN — HYDROXYZINE HYDROCHLORIDE 25 MG: 25 TABLET, FILM COATED ORAL at 22:02

## 2024-03-08 RX ADMIN — SENNOSIDES 8.6 MG: 8.6 TABLET, FILM COATED ORAL at 19:44

## 2024-03-08 RX ADMIN — DICYCLOMINE HYDROCHLORIDE 20 MG: 10 CAPSULE ORAL at 19:44

## 2024-03-08 RX ADMIN — ATORVASTATIN CALCIUM 20 MG: 20 TABLET, FILM COATED ORAL at 19:44

## 2024-03-08 RX ADMIN — INSULIN LISPRO 8 UNITS: 100 INJECTION, SOLUTION INTRAVENOUS; SUBCUTANEOUS at 13:55

## 2024-03-08 RX ADMIN — POLYETHYLENE GLYCOL 3350 17 G: 17 POWDER, FOR SOLUTION ORAL at 09:42

## 2024-03-08 RX ADMIN — SODIUM CHLORIDE, PRESERVATIVE FREE 10 ML: 5 INJECTION INTRAVENOUS at 19:45

## 2024-03-08 RX ADMIN — HYDROXYZINE HYDROCHLORIDE 25 MG: 25 TABLET, FILM COATED ORAL at 09:42

## 2024-03-08 RX ADMIN — TAMSULOSIN HYDROCHLORIDE 0.4 MG: 0.4 CAPSULE ORAL at 09:44

## 2024-03-08 RX ADMIN — DICYCLOMINE HYDROCHLORIDE 20 MG: 10 CAPSULE ORAL at 17:01

## 2024-03-08 RX ADMIN — ACETAMINOPHEN 650 MG: 325 TABLET ORAL at 17:01

## 2024-03-08 RX ADMIN — PANTOPRAZOLE SODIUM 40 MG: 40 TABLET, DELAYED RELEASE ORAL at 06:57

## 2024-03-08 RX ADMIN — INSULIN LISPRO 4 UNITS: 100 INJECTION, SOLUTION INTRAVENOUS; SUBCUTANEOUS at 09:42

## 2024-03-08 ASSESSMENT — PAIN DESCRIPTION - FREQUENCY
FREQUENCY: CONTINUOUS
FREQUENCY: CONTINUOUS

## 2024-03-08 ASSESSMENT — PAIN DESCRIPTION - DIRECTION
RADIATING_TOWARDS: LOWER LEFT LEG
RADIATING_TOWARDS: LOWER LEFT LEG

## 2024-03-08 ASSESSMENT — PAIN DESCRIPTION - DESCRIPTORS
DESCRIPTORS: ACHING;THROBBING
DESCRIPTORS: SQUEEZING

## 2024-03-08 ASSESSMENT — PAIN - FUNCTIONAL ASSESSMENT
PAIN_FUNCTIONAL_ASSESSMENT: PREVENTS OR INTERFERES SOME ACTIVE ACTIVITIES AND ADLS
PAIN_FUNCTIONAL_ASSESSMENT: PREVENTS OR INTERFERES SOME ACTIVE ACTIVITIES AND ADLS

## 2024-03-08 ASSESSMENT — PAIN SCALES - GENERAL
PAINLEVEL_OUTOF10: 3
PAINLEVEL_OUTOF10: 0
PAINLEVEL_OUTOF10: 0
PAINLEVEL_OUTOF10: 3

## 2024-03-08 ASSESSMENT — PAIN DESCRIPTION - PAIN TYPE
TYPE: CHRONIC PAIN
TYPE: ACUTE PAIN

## 2024-03-08 ASSESSMENT — PAIN DESCRIPTION - LOCATION
LOCATION: LEG
LOCATION: LEG

## 2024-03-08 ASSESSMENT — PAIN DESCRIPTION - ORIENTATION
ORIENTATION: LEFT
ORIENTATION: LEFT

## 2024-03-08 ASSESSMENT — PAIN DESCRIPTION - ONSET
ONSET: ON-GOING
ONSET: ON-GOING

## 2024-03-08 NOTE — CARE COORDINATION
3/8/24, 2:24 PM EST    DISCHARGE ON GOING EVALUATION    Consuelo LAM [place]       [facility] day: 3  Location: [facility] Reason for admit: Sinus tachycardia [R00.0]  Acute pyelonephritis [N10]  Leg swelling [M79.89]  Urinary tract obstruction due to kidney stone [N20.0, N13.8]  Sepsis (HCC) [A41.9]   Procedure:   3/5 CT abd/pelvis: A 4 mm obstructing calculus in the mid to distal left ureter results in mild left hydronephrosis and hydroureter with left perinephric stranding and inflammation. Colonic diverticulosis without diverticulitis.  Normal appendix. Numerous chronic findings.  3/6 OR with Dr. Noriega: Cystoscopy with left ureteral stent insertion.   3/7 Echo: EF 60-65%.     Barriers to Discharge: Hospitalist following. WBC 14.7. PT/OT. Rocephin iv daily. DuoNeb bid.     PCP: Eddi Rangel MD  Readmission Risk Score: 11.3%  Patient Goals/Plan/Treatment Preferences: Plans home with . Pt has a private caregiver 1x/week;  2x/week. Has DME. Denies discharge needs.     Anticipate discharge this weekend.     3/8/24, 2:29 PM EST    Patient goals/plan/ treatment preferences discussed by  and .  Patient goals/plan/ treatment preferences reviewed with patient/ family.  Patient/ family verbalize understanding of discharge plan and are in agreement with goal/plan/treatment preferences.  Understanding was demonstrated using the teach back method.  AVS provided by RN at time of discharge, which includes all necessary medical information pertaining to the patients current course of illness, treatment, post-discharge goals of care, and treatment preferences.     Services At/After Discharge: None       IMM Letter  IMM Letter given to Patient/Family/Significant other/Guardian/POA/by:: Lucila GALO CM  IMM Letter date given:: 03/08/24  IMM Letter time given:: 0805

## 2024-03-09 LAB
ANION GAP SERPL CALC-SCNC: 15 MEQ/L (ref 8–16)
BUN SERPL-MCNC: 11 MG/DL (ref 7–22)
CALCIUM SERPL-MCNC: 9.5 MG/DL (ref 8.5–10.5)
CHLORIDE SERPL-SCNC: 101 MEQ/L (ref 98–111)
CO2 SERPL-SCNC: 24 MEQ/L (ref 23–33)
CREAT SERPL-MCNC: 0.9 MG/DL (ref 0.4–1.2)
GFR SERPL CREATININE-BSD FRML MDRD: > 60 ML/MIN/1.73M2
GLUCOSE BLD STRIP.AUTO-MCNC: 161 MG/DL (ref 70–108)
GLUCOSE BLD STRIP.AUTO-MCNC: 200 MG/DL (ref 70–108)
GLUCOSE BLD STRIP.AUTO-MCNC: 223 MG/DL (ref 70–108)
GLUCOSE BLD STRIP.AUTO-MCNC: 229 MG/DL (ref 70–108)
GLUCOSE SERPL-MCNC: 221 MG/DL (ref 70–108)
POTASSIUM SERPL-SCNC: 4 MEQ/L (ref 3.5–5.2)
SODIUM SERPL-SCNC: 140 MEQ/L (ref 135–145)

## 2024-03-09 PROCEDURE — 6370000000 HC RX 637 (ALT 250 FOR IP)

## 2024-03-09 PROCEDURE — 6360000002 HC RX W HCPCS

## 2024-03-09 PROCEDURE — 6370000000 HC RX 637 (ALT 250 FOR IP): Performed by: STUDENT IN AN ORGANIZED HEALTH CARE EDUCATION/TRAINING PROGRAM

## 2024-03-09 PROCEDURE — 2140000000 HC CCU INTERMEDIATE R&B

## 2024-03-09 PROCEDURE — 36415 COLL VENOUS BLD VENIPUNCTURE: CPT

## 2024-03-09 PROCEDURE — 6360000002 HC RX W HCPCS: Performed by: STUDENT IN AN ORGANIZED HEALTH CARE EDUCATION/TRAINING PROGRAM

## 2024-03-09 PROCEDURE — 82948 REAGENT STRIP/BLOOD GLUCOSE: CPT

## 2024-03-09 PROCEDURE — 94640 AIRWAY INHALATION TREATMENT: CPT

## 2024-03-09 PROCEDURE — 99233 SBSQ HOSP IP/OBS HIGH 50: CPT | Performed by: STUDENT IN AN ORGANIZED HEALTH CARE EDUCATION/TRAINING PROGRAM

## 2024-03-09 PROCEDURE — 2580000003 HC RX 258: Performed by: STUDENT IN AN ORGANIZED HEALTH CARE EDUCATION/TRAINING PROGRAM

## 2024-03-09 PROCEDURE — 80048 BASIC METABOLIC PNL TOTAL CA: CPT

## 2024-03-09 RX ORDER — INSULIN LISPRO 100 [IU]/ML
0-8 INJECTION, SOLUTION INTRAVENOUS; SUBCUTANEOUS
Status: DISCONTINUED | OUTPATIENT
Start: 2024-03-09 | End: 2024-03-11 | Stop reason: HOSPADM

## 2024-03-09 RX ORDER — INSULIN LISPRO 100 [IU]/ML
3 INJECTION, SOLUTION INTRAVENOUS; SUBCUTANEOUS
Status: DISCONTINUED | OUTPATIENT
Start: 2024-03-09 | End: 2024-03-09

## 2024-03-09 RX ORDER — BUSPIRONE HYDROCHLORIDE 5 MG/1
5 TABLET ORAL 3 TIMES DAILY
Status: DISCONTINUED | OUTPATIENT
Start: 2024-03-09 | End: 2024-03-11 | Stop reason: HOSPADM

## 2024-03-09 RX ORDER — INSULIN LISPRO 100 [IU]/ML
4 INJECTION, SOLUTION INTRAVENOUS; SUBCUTANEOUS
Status: DISCONTINUED | OUTPATIENT
Start: 2024-03-09 | End: 2024-03-10

## 2024-03-09 RX ORDER — INSULIN LISPRO 100 [IU]/ML
0-4 INJECTION, SOLUTION INTRAVENOUS; SUBCUTANEOUS NIGHTLY
Status: DISCONTINUED | OUTPATIENT
Start: 2024-03-09 | End: 2024-03-11 | Stop reason: HOSPADM

## 2024-03-09 RX ADMIN — IPRATROPIUM BROMIDE 0.5 MG: 0.5 SOLUTION RESPIRATORY (INHALATION) at 02:24

## 2024-03-09 RX ADMIN — IPRATROPIUM BROMIDE AND ALBUTEROL SULFATE 1 DOSE: .5; 3 SOLUTION RESPIRATORY (INHALATION) at 17:16

## 2024-03-09 RX ADMIN — DICYCLOMINE HYDROCHLORIDE 20 MG: 10 CAPSULE ORAL at 17:02

## 2024-03-09 RX ADMIN — BUSPIRONE HYDROCHLORIDE 5 MG: 5 TABLET ORAL at 13:41

## 2024-03-09 RX ADMIN — ACETAMINOPHEN 650 MG: 325 TABLET ORAL at 20:04

## 2024-03-09 RX ADMIN — LOSARTAN POTASSIUM 50 MG: 50 TABLET, FILM COATED ORAL at 20:57

## 2024-03-09 RX ADMIN — INSULIN LISPRO 4 UNITS: 100 INJECTION, SOLUTION INTRAVENOUS; SUBCUTANEOUS at 17:02

## 2024-03-09 RX ADMIN — DOCUSATE SODIUM 100 MG: 100 CAPSULE, LIQUID FILLED ORAL at 10:47

## 2024-03-09 RX ADMIN — DICYCLOMINE HYDROCHLORIDE 20 MG: 10 CAPSULE ORAL at 20:05

## 2024-03-09 RX ADMIN — SODIUM CHLORIDE, PRESERVATIVE FREE 10 ML: 5 INJECTION INTRAVENOUS at 10:49

## 2024-03-09 RX ADMIN — IPRATROPIUM BROMIDE AND ALBUTEROL SULFATE 1 DOSE: .5; 3 SOLUTION RESPIRATORY (INHALATION) at 07:49

## 2024-03-09 RX ADMIN — DICYCLOMINE HYDROCHLORIDE 20 MG: 10 CAPSULE ORAL at 10:47

## 2024-03-09 RX ADMIN — SENNOSIDES 8.6 MG: 8.6 TABLET, FILM COATED ORAL at 20:04

## 2024-03-09 RX ADMIN — INSULIN LISPRO 4 UNITS: 100 INJECTION, SOLUTION INTRAVENOUS; SUBCUTANEOUS at 10:48

## 2024-03-09 RX ADMIN — ATORVASTATIN CALCIUM 20 MG: 20 TABLET, FILM COATED ORAL at 20:04

## 2024-03-09 RX ADMIN — CEFTRIAXONE SODIUM 2000 MG: 2 INJECTION, POWDER, FOR SOLUTION INTRAMUSCULAR; INTRAVENOUS at 12:52

## 2024-03-09 RX ADMIN — DOXEPIN HYDROCHLORIDE 100 MG: 50 CAPSULE ORAL at 20:04

## 2024-03-09 RX ADMIN — INSULIN LISPRO 3 UNITS: 100 INJECTION, SOLUTION INTRAVENOUS; SUBCUTANEOUS at 13:26

## 2024-03-09 RX ADMIN — HYDROXYZINE HYDROCHLORIDE 25 MG: 25 TABLET, FILM COATED ORAL at 20:04

## 2024-03-09 RX ADMIN — INSULIN GLARGINE 10 UNITS: 100 INJECTION, SOLUTION SUBCUTANEOUS at 20:04

## 2024-03-09 RX ADMIN — INSULIN LISPRO 4 UNITS: 100 INJECTION, SOLUTION INTRAVENOUS; SUBCUTANEOUS at 13:26

## 2024-03-09 RX ADMIN — ENOXAPARIN SODIUM 40 MG: 100 INJECTION SUBCUTANEOUS at 20:04

## 2024-03-09 RX ADMIN — METOPROLOL TARTRATE 50 MG: 50 TABLET, FILM COATED ORAL at 20:04

## 2024-03-09 RX ADMIN — QUETIAPINE FUMARATE 25 MG: 25 TABLET ORAL at 20:04

## 2024-03-09 RX ADMIN — HYDROXYZINE HYDROCHLORIDE 25 MG: 25 TABLET, FILM COATED ORAL at 11:10

## 2024-03-09 RX ADMIN — BUSPIRONE HYDROCHLORIDE 5 MG: 5 TABLET ORAL at 20:04

## 2024-03-09 RX ADMIN — TAMSULOSIN HYDROCHLORIDE 0.4 MG: 0.4 CAPSULE ORAL at 10:48

## 2024-03-09 ASSESSMENT — PAIN SCALES - GENERAL: PAINLEVEL_OUTOF10: 3

## 2024-03-09 ASSESSMENT — PAIN DESCRIPTION - DESCRIPTORS: DESCRIPTORS: ACHING

## 2024-03-09 ASSESSMENT — PAIN DESCRIPTION - DIRECTION: RADIATING_TOWARDS: LEGS

## 2024-03-09 ASSESSMENT — PAIN - FUNCTIONAL ASSESSMENT: PAIN_FUNCTIONAL_ASSESSMENT: PREVENTS OR INTERFERES SOME ACTIVE ACTIVITIES AND ADLS

## 2024-03-09 ASSESSMENT — PAIN DESCRIPTION - ONSET: ONSET: ON-GOING

## 2024-03-09 ASSESSMENT — PAIN DESCRIPTION - FREQUENCY: FREQUENCY: CONTINUOUS

## 2024-03-09 ASSESSMENT — PAIN DESCRIPTION - ORIENTATION: ORIENTATION: RIGHT;LEFT

## 2024-03-09 ASSESSMENT — PAIN DESCRIPTION - LOCATION: LOCATION: LEG

## 2024-03-09 ASSESSMENT — PAIN DESCRIPTION - PAIN TYPE: TYPE: ACUTE PAIN

## 2024-03-09 NOTE — PLAN OF CARE
Problem: Respiratory - Adult  Goal: Clear lung sounds  Description: Clear lung sounds  Outcome: Progressing   Continue aerosols to help improve breath sounds.    Patient mutually agreed on goals.

## 2024-03-09 NOTE — PLAN OF CARE
Problem: Discharge Planning  Goal: Discharge to home or other facility with appropriate resources  Flowsheets (Taken 3/9/2024 1800)  Discharge to home or other facility with appropriate resources: Identify barriers to discharge with patient and caregiver     Problem: Safety - Adult  Goal: Free from fall injury  Flowsheets (Taken 3/9/2024 1800)  Free From Fall Injury: Instruct family/caregiver on patient safety     Problem: Infection - Adult  Goal: Absence of infection during hospitalization  Flowsheets (Taken 3/9/2024 1800)  Absence of infection during hospitalization:   Assess and monitor for signs and symptoms of infection   Monitor lab/diagnostic results   Administer medications as ordered   Identify and instruct in appropriate isolation precautions for identified infection/condition     Problem: Pain  Goal: Verbalizes/displays adequate comfort level or baseline comfort level  Flowsheets (Taken 3/9/2024 1800)  Verbalizes/displays adequate comfort level or baseline comfort level: Encourage patient to monitor pain and request assistance   Care plan reviewed with patient.  Patient verbalizes understanding of the care plan and contributed to goal setting.

## 2024-03-10 ENCOUNTER — APPOINTMENT (OUTPATIENT)
Dept: GENERAL RADIOLOGY | Age: 84
DRG: 853 | End: 2024-03-10
Payer: MEDICARE

## 2024-03-10 LAB
BACTERIA BLD AEROBE CULT: NORMAL
GLUCOSE BLD STRIP.AUTO-MCNC: 210 MG/DL (ref 70–108)
GLUCOSE BLD STRIP.AUTO-MCNC: 217 MG/DL (ref 70–108)
GLUCOSE BLD STRIP.AUTO-MCNC: 242 MG/DL (ref 70–108)
GLUCOSE BLD STRIP.AUTO-MCNC: 255 MG/DL (ref 70–108)

## 2024-03-10 PROCEDURE — 94640 AIRWAY INHALATION TREATMENT: CPT

## 2024-03-10 PROCEDURE — 6360000002 HC RX W HCPCS: Performed by: STUDENT IN AN ORGANIZED HEALTH CARE EDUCATION/TRAINING PROGRAM

## 2024-03-10 PROCEDURE — 82948 REAGENT STRIP/BLOOD GLUCOSE: CPT

## 2024-03-10 PROCEDURE — 71045 X-RAY EXAM CHEST 1 VIEW: CPT

## 2024-03-10 PROCEDURE — 99233 SBSQ HOSP IP/OBS HIGH 50: CPT | Performed by: STUDENT IN AN ORGANIZED HEALTH CARE EDUCATION/TRAINING PROGRAM

## 2024-03-10 PROCEDURE — 2580000003 HC RX 258: Performed by: STUDENT IN AN ORGANIZED HEALTH CARE EDUCATION/TRAINING PROGRAM

## 2024-03-10 PROCEDURE — 6370000000 HC RX 637 (ALT 250 FOR IP)

## 2024-03-10 PROCEDURE — 6370000000 HC RX 637 (ALT 250 FOR IP): Performed by: STUDENT IN AN ORGANIZED HEALTH CARE EDUCATION/TRAINING PROGRAM

## 2024-03-10 PROCEDURE — 2140000000 HC CCU INTERMEDIATE R&B

## 2024-03-10 PROCEDURE — 94761 N-INVAS EAR/PLS OXIMETRY MLT: CPT

## 2024-03-10 RX ORDER — HYDRALAZINE HYDROCHLORIDE 20 MG/ML
5 INJECTION INTRAMUSCULAR; INTRAVENOUS ONCE
Status: COMPLETED | OUTPATIENT
Start: 2024-03-10 | End: 2024-03-10

## 2024-03-10 RX ORDER — INSULIN LISPRO 100 [IU]/ML
5 INJECTION, SOLUTION INTRAVENOUS; SUBCUTANEOUS
Status: DISCONTINUED | OUTPATIENT
Start: 2024-03-10 | End: 2024-03-11 | Stop reason: HOSPADM

## 2024-03-10 RX ORDER — METOPROLOL SUCCINATE 50 MG/1
50 TABLET, EXTENDED RELEASE ORAL DAILY
Status: DISCONTINUED | OUTPATIENT
Start: 2024-03-10 | End: 2024-03-11 | Stop reason: HOSPADM

## 2024-03-10 RX ORDER — AMLODIPINE BESYLATE 5 MG/1
5 TABLET ORAL DAILY
Status: DISCONTINUED | OUTPATIENT
Start: 2024-03-10 | End: 2024-03-11

## 2024-03-10 RX ADMIN — INSULIN LISPRO 2 UNITS: 100 INJECTION, SOLUTION INTRAVENOUS; SUBCUTANEOUS at 17:45

## 2024-03-10 RX ADMIN — CEFTRIAXONE SODIUM 2000 MG: 2 INJECTION, POWDER, FOR SOLUTION INTRAMUSCULAR; INTRAVENOUS at 12:25

## 2024-03-10 RX ADMIN — ACETAMINOPHEN 650 MG: 325 TABLET ORAL at 16:05

## 2024-03-10 RX ADMIN — QUETIAPINE FUMARATE 25 MG: 25 TABLET ORAL at 22:08

## 2024-03-10 RX ADMIN — DICYCLOMINE HYDROCHLORIDE 20 MG: 10 CAPSULE ORAL at 16:05

## 2024-03-10 RX ADMIN — HYDROXYZINE HYDROCHLORIDE 25 MG: 25 TABLET, FILM COATED ORAL at 22:08

## 2024-03-10 RX ADMIN — IPRATROPIUM BROMIDE AND ALBUTEROL SULFATE 1 DOSE: .5; 3 SOLUTION RESPIRATORY (INHALATION) at 16:30

## 2024-03-10 RX ADMIN — BUSPIRONE HYDROCHLORIDE 5 MG: 5 TABLET ORAL at 07:50

## 2024-03-10 RX ADMIN — IPRATROPIUM BROMIDE AND ALBUTEROL SULFATE 1 DOSE: .5; 3 SOLUTION RESPIRATORY (INHALATION) at 09:34

## 2024-03-10 RX ADMIN — METOPROLOL SUCCINATE 50 MG: 50 TABLET, EXTENDED RELEASE ORAL at 10:16

## 2024-03-10 RX ADMIN — INSULIN LISPRO 2 UNITS: 100 INJECTION, SOLUTION INTRAVENOUS; SUBCUTANEOUS at 12:28

## 2024-03-10 RX ADMIN — INSULIN GLARGINE 10 UNITS: 100 INJECTION, SOLUTION SUBCUTANEOUS at 19:49

## 2024-03-10 RX ADMIN — AMLODIPINE BESYLATE 5 MG: 5 TABLET ORAL at 12:21

## 2024-03-10 RX ADMIN — SODIUM CHLORIDE, PRESERVATIVE FREE 10 ML: 5 INJECTION INTRAVENOUS at 19:44

## 2024-03-10 RX ADMIN — TAMSULOSIN HYDROCHLORIDE 0.4 MG: 0.4 CAPSULE ORAL at 07:50

## 2024-03-10 RX ADMIN — INSULIN LISPRO 5 UNITS: 100 INJECTION, SOLUTION INTRAVENOUS; SUBCUTANEOUS at 17:44

## 2024-03-10 RX ADMIN — DICYCLOMINE HYDROCHLORIDE 20 MG: 10 CAPSULE ORAL at 19:42

## 2024-03-10 RX ADMIN — INSULIN LISPRO 4 UNITS: 100 INJECTION, SOLUTION INTRAVENOUS; SUBCUTANEOUS at 08:08

## 2024-03-10 RX ADMIN — BUSPIRONE HYDROCHLORIDE 5 MG: 5 TABLET ORAL at 19:42

## 2024-03-10 RX ADMIN — SODIUM CHLORIDE, PRESERVATIVE FREE 10 ML: 5 INJECTION INTRAVENOUS at 07:51

## 2024-03-10 RX ADMIN — BUSPIRONE HYDROCHLORIDE 5 MG: 5 TABLET ORAL at 14:37

## 2024-03-10 RX ADMIN — INSULIN LISPRO 4 UNITS: 100 INJECTION, SOLUTION INTRAVENOUS; SUBCUTANEOUS at 12:29

## 2024-03-10 RX ADMIN — DOXEPIN HYDROCHLORIDE 100 MG: 50 CAPSULE ORAL at 19:42

## 2024-03-10 RX ADMIN — ENOXAPARIN SODIUM 40 MG: 100 INJECTION SUBCUTANEOUS at 19:42

## 2024-03-10 RX ADMIN — DICYCLOMINE HYDROCHLORIDE 20 MG: 10 CAPSULE ORAL at 12:21

## 2024-03-10 RX ADMIN — DOCUSATE SODIUM 100 MG: 100 CAPSULE, LIQUID FILLED ORAL at 07:50

## 2024-03-10 RX ADMIN — PANTOPRAZOLE SODIUM 40 MG: 40 TABLET, DELAYED RELEASE ORAL at 07:42

## 2024-03-10 RX ADMIN — ATORVASTATIN CALCIUM 20 MG: 20 TABLET, FILM COATED ORAL at 19:42

## 2024-03-10 RX ADMIN — HYDRALAZINE HYDROCHLORIDE 5 MG: 20 INJECTION, SOLUTION INTRAMUSCULAR; INTRAVENOUS at 08:13

## 2024-03-10 RX ADMIN — INSULIN LISPRO 2 UNITS: 100 INJECTION, SOLUTION INTRAVENOUS; SUBCUTANEOUS at 08:08

## 2024-03-10 RX ADMIN — ACETAMINOPHEN 650 MG: 325 TABLET ORAL at 07:51

## 2024-03-10 RX ADMIN — BUMETANIDE 1 MG: 1 TABLET ORAL at 08:48

## 2024-03-10 RX ADMIN — DICYCLOMINE HYDROCHLORIDE 20 MG: 10 CAPSULE ORAL at 07:42

## 2024-03-10 ASSESSMENT — PAIN DESCRIPTION - LOCATION
LOCATION: COCCYX
LOCATION: HEAD

## 2024-03-10 ASSESSMENT — PAIN DESCRIPTION - ORIENTATION
ORIENTATION: LOWER
ORIENTATION: MID

## 2024-03-10 ASSESSMENT — PAIN SCALES - GENERAL
PAINLEVEL_OUTOF10: 3
PAINLEVEL_OUTOF10: 0
PAINLEVEL_OUTOF10: 0
PAINLEVEL_OUTOF10: 3

## 2024-03-10 ASSESSMENT — PAIN DESCRIPTION - ONSET
ONSET: GRADUAL
ONSET: ON-GOING

## 2024-03-10 ASSESSMENT — PAIN - FUNCTIONAL ASSESSMENT
PAIN_FUNCTIONAL_ASSESSMENT: ACTIVITIES ARE NOT PREVENTED
PAIN_FUNCTIONAL_ASSESSMENT: ACTIVITIES ARE NOT PREVENTED

## 2024-03-10 ASSESSMENT — PAIN DESCRIPTION - PAIN TYPE
TYPE: CHRONIC PAIN
TYPE: ACUTE PAIN

## 2024-03-10 ASSESSMENT — PAIN DESCRIPTION - DESCRIPTORS
DESCRIPTORS: ACHING
DESCRIPTORS: ACHING

## 2024-03-10 ASSESSMENT — PAIN DESCRIPTION - FREQUENCY
FREQUENCY: CONTINUOUS
FREQUENCY: CONTINUOUS

## 2024-03-10 NOTE — PLAN OF CARE
Problem: Discharge Planning  Goal: Discharge to home or other facility with appropriate resources  Outcome: Progressing  Flowsheets (Taken 3/10/2024 1545)  Discharge to home or other facility with appropriate resources: Identify barriers to discharge with patient and caregiver     Problem: Safety - Adult  Goal: Free from fall injury  Outcome: Progressing  Flowsheets (Taken 3/10/2024 1545)  Free From Fall Injury: Instruct family/caregiver on patient safety     Problem: Infection - Adult  Goal: Absence of infection during hospitalization  Outcome: Progressing  Flowsheets (Taken 3/10/2024 1545)  Absence of infection during hospitalization:   Assess and monitor for signs and symptoms of infection   Monitor lab/diagnostic results     Problem: Chronic Conditions and Co-morbidities  Goal: Patient's chronic conditions and co-morbidity symptoms are monitored and maintained or improved  Outcome: Progressing     Problem: Pain  Goal: Verbalizes/displays adequate comfort level or baseline comfort level  Outcome: Progressing  Flowsheets (Taken 3/10/2024 1545)  Verbalizes/displays adequate comfort level or baseline comfort level:   Encourage patient to monitor pain and request assistance   Administer analgesics based on type and severity of pain and evaluate response   Care plan reviewed with patient.  Patient verbalizes understanding of the care plan and contributed to goal setting.

## 2024-03-11 VITALS
SYSTOLIC BLOOD PRESSURE: 147 MMHG | BODY MASS INDEX: 34.76 KG/M2 | TEMPERATURE: 99 F | HEIGHT: 66 IN | WEIGHT: 216.27 LBS | RESPIRATION RATE: 16 BRPM | OXYGEN SATURATION: 97 % | DIASTOLIC BLOOD PRESSURE: 76 MMHG | HEART RATE: 99 BPM

## 2024-03-11 LAB
ANION GAP SERPL CALC-SCNC: 17 MEQ/L (ref 8–16)
BUN SERPL-MCNC: 12 MG/DL (ref 7–22)
CALCIUM SERPL-MCNC: 9.4 MG/DL (ref 8.5–10.5)
CHLORIDE SERPL-SCNC: 104 MEQ/L (ref 98–111)
CO2 SERPL-SCNC: 24 MEQ/L (ref 23–33)
CREAT SERPL-MCNC: 0.9 MG/DL (ref 0.4–1.2)
DEPRECATED RDW RBC AUTO: 43 FL (ref 35–45)
ERYTHROCYTE [DISTWIDTH] IN BLOOD BY AUTOMATED COUNT: 13.2 % (ref 11.5–14.5)
GFR SERPL CREATININE-BSD FRML MDRD: > 60 ML/MIN/1.73M2
GLUCOSE BLD STRIP.AUTO-MCNC: 198 MG/DL (ref 70–108)
GLUCOSE BLD STRIP.AUTO-MCNC: 296 MG/DL (ref 70–108)
GLUCOSE SERPL-MCNC: 181 MG/DL (ref 70–108)
HCT VFR BLD AUTO: 39.2 % (ref 37–47)
HGB BLD-MCNC: 12.3 GM/DL (ref 12–16)
MAGNESIUM SERPL-MCNC: 1.8 MG/DL (ref 1.6–2.4)
MCH RBC QN AUTO: 28.2 PG (ref 26–33)
MCHC RBC AUTO-ENTMCNC: 31.4 GM/DL (ref 32.2–35.5)
MCV RBC AUTO: 89.9 FL (ref 81–99)
PHOSPHATE SERPL-MCNC: 3.1 MG/DL (ref 2.4–4.7)
PLATELET # BLD AUTO: 186 THOU/MM3 (ref 130–400)
PMV BLD AUTO: 11.6 FL (ref 9.4–12.4)
POTASSIUM SERPL-SCNC: 3.2 MEQ/L (ref 3.5–5.2)
RBC # BLD AUTO: 4.36 MILL/MM3 (ref 4.2–5.4)
SODIUM SERPL-SCNC: 145 MEQ/L (ref 135–145)
WBC # BLD AUTO: 10.1 THOU/MM3 (ref 4.8–10.8)

## 2024-03-11 PROCEDURE — 97110 THERAPEUTIC EXERCISES: CPT

## 2024-03-11 PROCEDURE — 83735 ASSAY OF MAGNESIUM: CPT

## 2024-03-11 PROCEDURE — 36415 COLL VENOUS BLD VENIPUNCTURE: CPT

## 2024-03-11 PROCEDURE — 94761 N-INVAS EAR/PLS OXIMETRY MLT: CPT

## 2024-03-11 PROCEDURE — 51798 US URINE CAPACITY MEASURE: CPT

## 2024-03-11 PROCEDURE — 85027 COMPLETE CBC AUTOMATED: CPT

## 2024-03-11 PROCEDURE — 97535 SELF CARE MNGMENT TRAINING: CPT

## 2024-03-11 PROCEDURE — 2580000003 HC RX 258: Performed by: STUDENT IN AN ORGANIZED HEALTH CARE EDUCATION/TRAINING PROGRAM

## 2024-03-11 PROCEDURE — 6360000002 HC RX W HCPCS

## 2024-03-11 PROCEDURE — 94640 AIRWAY INHALATION TREATMENT: CPT

## 2024-03-11 PROCEDURE — 99239 HOSP IP/OBS DSCHRG MGMT >30: CPT | Performed by: STUDENT IN AN ORGANIZED HEALTH CARE EDUCATION/TRAINING PROGRAM

## 2024-03-11 PROCEDURE — 6370000000 HC RX 637 (ALT 250 FOR IP): Performed by: STUDENT IN AN ORGANIZED HEALTH CARE EDUCATION/TRAINING PROGRAM

## 2024-03-11 PROCEDURE — 82948 REAGENT STRIP/BLOOD GLUCOSE: CPT

## 2024-03-11 PROCEDURE — 92526 ORAL FUNCTION THERAPY: CPT

## 2024-03-11 PROCEDURE — 80048 BASIC METABOLIC PNL TOTAL CA: CPT

## 2024-03-11 PROCEDURE — 6360000002 HC RX W HCPCS: Performed by: STUDENT IN AN ORGANIZED HEALTH CARE EDUCATION/TRAINING PROGRAM

## 2024-03-11 PROCEDURE — 6370000000 HC RX 637 (ALT 250 FOR IP)

## 2024-03-11 PROCEDURE — 84100 ASSAY OF PHOSPHORUS: CPT

## 2024-03-11 PROCEDURE — 97530 THERAPEUTIC ACTIVITIES: CPT

## 2024-03-11 RX ORDER — BUSPIRONE HYDROCHLORIDE 5 MG/1
5 TABLET ORAL 2 TIMES DAILY
Qty: 60 TABLET | Refills: 0 | Status: SHIPPED | OUTPATIENT
Start: 2024-03-11 | End: 2024-04-10

## 2024-03-11 RX ORDER — TAMSULOSIN HYDROCHLORIDE 0.4 MG/1
0.4 CAPSULE ORAL DAILY
Qty: 30 CAPSULE | Refills: 0 | Status: SHIPPED | OUTPATIENT
Start: 2024-03-12 | End: 2024-04-11

## 2024-03-11 RX ORDER — POTASSIUM CHLORIDE 20 MEQ/1
20 TABLET, EXTENDED RELEASE ORAL ONCE
Status: COMPLETED | OUTPATIENT
Start: 2024-03-11 | End: 2024-03-11

## 2024-03-11 RX ORDER — METOPROLOL SUCCINATE 50 MG/1
50 TABLET, EXTENDED RELEASE ORAL DAILY
Qty: 30 TABLET | Refills: 0 | Status: SHIPPED | OUTPATIENT
Start: 2024-03-12 | End: 2024-04-11

## 2024-03-11 RX ORDER — AMLODIPINE BESYLATE 10 MG/1
10 TABLET ORAL DAILY
Qty: 30 TABLET | Refills: 0 | Status: SHIPPED | OUTPATIENT
Start: 2024-03-12 | End: 2024-04-11

## 2024-03-11 RX ORDER — AMOXICILLIN AND CLAVULANATE POTASSIUM 875; 125 MG/1; MG/1
1 TABLET, FILM COATED ORAL 2 TIMES DAILY
Qty: 20 TABLET | Refills: 0 | Status: SHIPPED | OUTPATIENT
Start: 2024-03-11 | End: 2024-03-21

## 2024-03-11 RX ORDER — AMLODIPINE BESYLATE 10 MG/1
10 TABLET ORAL DAILY
Status: DISCONTINUED | OUTPATIENT
Start: 2024-03-11 | End: 2024-03-11 | Stop reason: HOSPADM

## 2024-03-11 RX ORDER — QUETIAPINE FUMARATE 25 MG/1
25 TABLET, FILM COATED ORAL NIGHTLY
Qty: 30 TABLET | Refills: 0 | Status: SHIPPED | OUTPATIENT
Start: 2024-03-11 | End: 2024-04-10

## 2024-03-11 RX ORDER — BUMETANIDE 1 MG/1
1 TABLET ORAL DAILY
Qty: 30 TABLET | Refills: 0 | Status: SHIPPED | OUTPATIENT
Start: 2024-03-11 | End: 2024-04-10

## 2024-03-11 RX ADMIN — PANTOPRAZOLE SODIUM 40 MG: 40 TABLET, DELAYED RELEASE ORAL at 08:36

## 2024-03-11 RX ADMIN — BUSPIRONE HYDROCHLORIDE 5 MG: 5 TABLET ORAL at 08:36

## 2024-03-11 RX ADMIN — INSULIN LISPRO 5 UNITS: 100 INJECTION, SOLUTION INTRAVENOUS; SUBCUTANEOUS at 08:36

## 2024-03-11 RX ADMIN — AMLODIPINE BESYLATE 10 MG: 10 TABLET ORAL at 10:01

## 2024-03-11 RX ADMIN — DICYCLOMINE HYDROCHLORIDE 20 MG: 10 CAPSULE ORAL at 10:44

## 2024-03-11 RX ADMIN — DICYCLOMINE HYDROCHLORIDE 20 MG: 10 CAPSULE ORAL at 08:36

## 2024-03-11 RX ADMIN — METOPROLOL SUCCINATE 50 MG: 50 TABLET, EXTENDED RELEASE ORAL at 08:36

## 2024-03-11 RX ADMIN — INSULIN LISPRO 4 UNITS: 100 INJECTION, SOLUTION INTRAVENOUS; SUBCUTANEOUS at 12:47

## 2024-03-11 RX ADMIN — INSULIN LISPRO 5 UNITS: 100 INJECTION, SOLUTION INTRAVENOUS; SUBCUTANEOUS at 12:47

## 2024-03-11 RX ADMIN — POTASSIUM CHLORIDE 20 MEQ: 1500 TABLET, EXTENDED RELEASE ORAL at 08:43

## 2024-03-11 RX ADMIN — HYDROXYZINE HYDROCHLORIDE 25 MG: 25 TABLET, FILM COATED ORAL at 08:41

## 2024-03-11 RX ADMIN — SODIUM CHLORIDE, PRESERVATIVE FREE 10 ML: 5 INJECTION INTRAVENOUS at 08:37

## 2024-03-11 RX ADMIN — IPRATROPIUM BROMIDE AND ALBUTEROL SULFATE 1 DOSE: .5; 3 SOLUTION RESPIRATORY (INHALATION) at 09:23

## 2024-03-11 RX ADMIN — CEFTRIAXONE SODIUM 2000 MG: 2 INJECTION, POWDER, FOR SOLUTION INTRAMUSCULAR; INTRAVENOUS at 11:49

## 2024-03-11 RX ADMIN — BUMETANIDE 1 MG: 1 TABLET ORAL at 10:01

## 2024-03-11 RX ADMIN — ACETAMINOPHEN 650 MG: 325 TABLET ORAL at 10:43

## 2024-03-11 RX ADMIN — TAMSULOSIN HYDROCHLORIDE 0.4 MG: 0.4 CAPSULE ORAL at 08:36

## 2024-03-11 RX ADMIN — IPRATROPIUM BROMIDE 0.5 MG: 0.5 SOLUTION RESPIRATORY (INHALATION) at 03:05

## 2024-03-11 RX ADMIN — BUSPIRONE HYDROCHLORIDE 5 MG: 5 TABLET ORAL at 12:46

## 2024-03-11 ASSESSMENT — PAIN DESCRIPTION - ORIENTATION: ORIENTATION: MID

## 2024-03-11 ASSESSMENT — PAIN SCALES - GENERAL
PAINLEVEL_OUTOF10: 1
PAINLEVEL_OUTOF10: 3

## 2024-03-11 ASSESSMENT — PAIN DESCRIPTION - ONSET: ONSET: ON-GOING

## 2024-03-11 ASSESSMENT — PAIN - FUNCTIONAL ASSESSMENT: PAIN_FUNCTIONAL_ASSESSMENT: ACTIVITIES ARE NOT PREVENTED

## 2024-03-11 ASSESSMENT — PAIN DESCRIPTION - FREQUENCY: FREQUENCY: INTERMITTENT

## 2024-03-11 ASSESSMENT — PAIN DESCRIPTION - LOCATION: LOCATION: THROAT

## 2024-03-11 ASSESSMENT — PAIN DESCRIPTION - DESCRIPTORS: DESCRIPTORS: SORE

## 2024-03-11 ASSESSMENT — PAIN DESCRIPTION - PAIN TYPE: TYPE: ACUTE PAIN

## 2024-03-11 NOTE — CARE COORDINATION
3/11/24, 2:27 PM EDT    Patient goals/plan/ treatment preferences discussed by  and .  Patient goals/plan/ treatment preferences reviewed with patient/ family.  Patient/ family verbalize understanding of discharge plan and are in agreement with goal/plan/treatment preferences.  Understanding was demonstrated using the teach back method.  AVS provided by RN at time of discharge, which includes all necessary medical information pertaining to the patients current course of illness, treatment, post-discharge goals of care, and treatment preferences.     Services At/After Discharge: Home Health, Nursing service, OT, and PT       IMM Letter  IMM Letter given to Patient/Family/Significant other/Guardian/POA/by:: Lucila GALO   IMM Letter date given:: 03/11/24  IMM Letter time given:: 1005     Planning home with  today. Orders received for HH. Post-acute (PAC) provider list was provided to patient. Patient was informed of their freedom to choose PAC provider. Discussed and offered to show the patient the relevant PAC Providers quality and resource use measures on Medicare Compare web site via computer based on patient's goals of care and treatment preferences. Questions regarding selection process were answered.    Pt and  prefer [facility]. Referral called to [facility] and message left. Pt going with orders for RN, PT, OT.

## 2024-03-11 NOTE — PROGRESS NOTES
Hospitalist Progress Note      Patient:  Consuelo Raygoza    Unit/Bed:3B-34/034-A  YOB: 1940  MRN: [id number]   Acct: [id number]   PCP: Eddi Rangel MD  Date of Admission: 3/5/2024    Assessment/Plan:  Elderly female admitted for sepsis due to UTI secondary to obstructive uropathy with evidence of obstructive ureteral stone, status post cystoscopy with stent placement in the left ureter 3/6/2024, POD#3. Remains afebrile and hemodynamically stable. Likely discharge tomorrow.      Sepsis/complicated UTI with obstructive nephrolithiasis with hematuria: S/p cystoscopy with left ureteral stent placement 3/6/24 per urology, Dr. Noriega. Urine culture and blood cultures positive for gram-negative bacilli.  Lactic acidosis/tachycardia/tachypnea resolved.  Leukocytosis improving. Zosyn de-escalated to Rocephin 3/7. Maintenance IVF discontinued.  Monitor on telemetry. Per Urology, \"Will need to go home on 2 weeks antibiotic course (pending final cultures) leading up to day of definitive stone treatment.\"    MERE: Due to obstructive uropathy/ sepsis. Creatinine 1.2 with eGFR 44 on admission, worse on 3/6 with Cr 1.7 with GFR 29.  Cr downtrending. Baseline creatinine 0.9 and eGFR >60 on 11/13/2023. No major associated electrolyte or metabolic disturbance. Repeat BMP daily for now. With external urinary catheter. Monitor UOP- no significant gross hematuria noted at this time.    R LE swelling: Noted on exam on admission.  No DVT per venous duplex done on admission 3/5/2024. BLE nonpitting edema appear more bilateral on my exam, has been at baseline per patient. Elevated legs. Last TTE 10/28/2022 showed normal EF 55% but was a technically difficult study with poor acoustic windows. Repeat TTE 3/7/24 with EF 60-65%, G1DD, RVSP 49 mmHg, technically difficult study. Home Bumex resumed.  Limit fluid intake. No hx CHF.     IDDMII: A1c 6.8 on 3/6/2024 from 7.1 on 
                                                   Hospitalist Progress Note      Patient:  Consuelo Raygoza    Unit/Bed:3B-34/034-A  YOB: 1940  MRN: [id number]   Acct: [id number]   PCP: Eddi Rangel MD  Date of Admission: 3/5/2024    Assessment/Plan:  Elderly female admitted for sepsis due to UTI secondary to obstructive uropathy with evidence of obstructive ureteral stone, status post cystoscopy with stent placement in the left ureter 3/6/2024, POD#2.  Remains afebrile and hemodynamically stable.       Sepsis/complicated UTI with obstructive nephrolithiasis with hematuria: Status post cystoscopy with left ureteral stent placement per urology, Dr. Noriega. Urine culture and blood cultures positive for gram-negative bacilli.  Lactic acidosis/tachycardia/tachypnea improving.  Leukocytosis improving. On Rocephin now. Monitor on telemetry.     MERE: Due to obstructive uropathy/ sepsis. Creatinine 1.2 with eGFR 44 on admission, worse on 3/6 with Cr 1.7 with GFR 29.  Cr downtrending. Baseline creatinine 0.9 and eGFR >60 on 11/13/2023. No major associated electrolyte or metabolic disturbance. Repeat BMP daily for now. With external urinary catheter. Monitor UOP- no significant gross hematuria noted at this time.    R LE swelling: Noted on exam on admission.  No DVT per venous duplex done on admission 3/5/2024. BLE nonpitting edema appear more bilateral on my exam, has been at baseline per patient. Elevated legs. Checking ECHO. Last TTE 10/28/2022 showed normal EF 55% but was a technically difficult study with poor acoustic windows.  Home Bumex held due to MERE.  Limit fluid intake.     IDDMII: A1c 6.8 on 3/6/2024 from 7.1 on 6/27/2016.  On metformin 400 mg in the morning and 850 in the evening.  Also on insulin NPH and Humalog sliding scale.  Acutely hyperglycemic in the setting of sepsis. Started on Lantus nightly now inpatient- dose 10 units.  On high-dose SSI with hypoglycemic protocol.  Monitor BG with 
                                                   Hospitalist Progress Note      Patient:  Consuelo Raygoza    Unit/Bed:3B-34/034-A  YOB: 1940  MRN: [id number]   Acct: [id number]   PCP: Eddi Rangel MD  Date of Admission: 3/5/2024    Assessment/Plan:  Elderly female admitted for sepsis due to UTI secondary to obstructive uropathy with evidence of obstructive ureteral stone, status post cystoscopy with stent placement in the left ureter 3/6/2024, POD#1.  Remains afebrile and hemodynamically stable.       Sepsis/complicated UTI with obstructive nephrolithiasis with hematuria: Status post cystoscopy with left ureteral stent placement per urology, Dr. Noriega. Urine culture and blood cultures positive for gram-negative bacilli.  Lactic acidosis/tachycardia/tachypnea improving.  Leukocytosis worse this morning but expected to improve now postprocedure. Continue Zosyn and maintenance IVF.  Given a diet.  Monitor on telemetry.     MERE: Due to obstructive uropathy/ sepsis. Creatinine 1.2 with eGFR 44 on admission, worse on 3/6 with Cr 1.7 with GFR 29.  Cr downtrending at 1.3 today. Baseline creatinine 0.9 and eGFR >60 on 11/13/2023. No major associated electrolyte or metabolic disturbance. Repeat BMP daily for now.     R LE swelling: Noted on exam on admission.  No DVT per venous duplex done on admission 3/5/2024. BLE nonpitting edema appear more bilateral on my exam, has been at baseline per patient. Elevated legs. Checking ECHO. Last TTE 10/28/2022 showed normal EF 55% but was a technically difficult study with poor acoustic windows.  Home Bumex held due to MERE.  Limit fluid intake.     IDDMII: A1c 6.8 on 3/6/2024 from 7.1 on 6/27/2016.  On metformin 400 mg in the morning and 850 in the evening.  Also on insulin NPH and Humalog sliding scale.  Acutely hyperglycemic in the setting of sepsis. Started on Lantus nightly now inpatient- dose 10 units.  On high-dose SSI with hypoglycemic protocol.  Monitor BG 
 [facility]  INPATIENT PHYSICAL THERAPY  DAILY NOTE  STRZ CCU-STEPDOWN 3B - 3B-34/034-A    Time In: 1102  Time Out: 1125  Timed Code Treatment Minutes: 23 Minutes  Minutes: 23          Date: 3/8/2024  Patient Name: Consuelo Raygoza,  Gender:  female        MRN: [id number]  : 1940  (83 y.o.)     Referring Practitioner: Luanne Mercado MD  Diagnosis: Sepsis  Additional Pertinent Hx: Consuelo Raygoza is a 83 y.o., , female who  has a past medical history of Cancer (HCC), History of blood transfusion, Hyperlipidemia, Hypertension, and Type II or unspecified type diabetes mellitus without mention of complication, not stated as uncontrolled. that is hospitalized for septic secondary to complicated obstruction nephrolithiasis.  Pt is s/p Cystoscopy, left ureteral stent placement 3/5.     Prior Level of Function:  Lives With: Spouse  Type of Home: House  Home Layout: One level  Home Access: Stairs to enter with rails  Entrance Stairs - Number of Steps: 3  Entrance Stairs - Rails: Both  Home Equipment: Walker, 4 wheeled, Wheelchair-manual, Hospital bed, Rollator   Bathroom Shower/Tub: Walk-in shower  Bathroom Toilet: Handicap height  Bathroom Equipment: Commode, Toilet raiser, Shower chair    ADL Assistance: Needs assistance  Homemaking Assistance: Needs assistance  Ambulation Assistance: Independent  Transfer Assistance: Independent  Active : No  Additional Comments: Pt has an aide 1x/week for showers, pt states she pushes a w/c for mobility around the home, the w/c moves better than the walker; has a  that comes every Wednesday    Restrictions/Precautions:  Restrictions/Precautions: Fall Risk     SUBJECTIVE: Pt in recliner upon arrival, and agrees to therapy, RN approved session, Pt A&O X , Pt pleasant and cooperative, pts spouse present during session    PAIN: LLE pt reporting she has hx of \"sciatica\"    Vitals: Vitals not assessed per clinical judgement, see nursing 
 [facility]  INPATIENT PHYSICAL THERAPY  DAILY NOTE  STRZ CCU-STEPDOWN 3B - 3B-34/034-A      Time In: 1438  Time Out: 1446  Timed Code Treatment Minutes: 8 Minutes  Minutes: 8          Date: 3/11/2024  Patient Name: Consuelo Raygoza,  Gender:  female        MRN: [id number]  : 1940  (83 y.o.)     Referring Practitioner: Luanne Mercado MD  Diagnosis: Sepsis  Additional Pertinent Hx: Consuelo Raygoza is a 83 y.o., , female who  has a past medical history of Cancer (HCC), History of blood transfusion, Hyperlipidemia, Hypertension, and Type II or unspecified type diabetes mellitus without mention of complication, not stated as uncontrolled. that is hospitalized for septic secondary to complicated obstruction nephrolithiasis.  Pt is s/p Cystoscopy, left ureteral stent placement 3/5.     Prior Level of Function:  Lives With: Spouse  Type of Home: House  Home Layout: One level  Home Access: Stairs to enter with rails  Entrance Stairs - Number of Steps: 3  Entrance Stairs - Rails: Both  Home Equipment: Walker, 4 wheeled, Wheelchair-manual, Hospital bed, Rollator   Bathroom Shower/Tub: Walk-in shower  Bathroom Toilet: Handicap height  Bathroom Equipment: Commode, Toilet raiser, Shower chair    ADL Assistance: Needs assistance  Homemaking Assistance: Needs assistance  Ambulation Assistance: Independent  Transfer Assistance: Independent  Active : No  Additional Comments: Pt has an aide 1x/week for showers, pt states she pushes a w/c for mobility around the home, the w/c moves better than the walker; has a  that comes every Wednesday    Restrictions/Precautions:  Restrictions/Precautions: Fall Risk       SUBJECTIVE: nursing ok'd therapy but asked to stay in bed as pt is getting a bladder scan shortly, pt in bed and reported that she is leaving today and has walked to the bathroom 2x and sat up on the edge of bed- pt voiced that her buttock is sore educated pt on importance of changing 
Discussed discharge summary with patient. Instructed patient about medications & follow up appointments at length with patient and patient's . Patient denies any additional questions. Patient was discharged with all belongings and new prescriptions. No distress noted. Patient discharged to home. Taken down to the vehicle per wheelchair.     
[facility]  OCCUPATIONAL THERAPY MISSED TREATMENT NOTE  STRZ CCU-STEPDOWN 3B  3B-34/034-A      Date: 3/6/2024  Patient Name: Consuelo Raygoza        CSN: [id number]   : 1940  (83 y.o.)  Gender: female                REASON FOR MISSED TREATMENT:  patient off floor in OR for CYSTOSCOPYLEFT  URETERAL STENT INSERTION this a.m., patient working with PT upon second attempt. OT to check back as able and time allows.                
Pharmacy Medication History Note      List of current medications patient is taking is complete.    Source of information: patient, dispense report    Changes made to medication list:  Medications removed (include reason, ex. therapy complete or physician discontinued):  Ascorbic acid- pt not taking  Bumetanide- physician discontinued  Calcium carbonate vitamin D- pt not taking  Insulin lispro- physician discontinued  Multivitamin- pt not taking  Potassium chloride- pt not taking    Medications added/doses adjusted:  Added: sildenafil 50 mg QD, vitamin D QD    Other notes (ex. Recent course of antibiotics, Coumadin dosing):  Denies use of other OTC or herbal medications.    Allergies reviewed    Electronically signed by Marialuisa Joe on 3/6/2024 at 11:16 AM                                                     
Pharmacy Note  BioFire Result    Consuelo Raygoza is a 83 y.o. female, with a positive blood culture result    Communication received from Vianey, laboratory employee on 3/6/2024 at 8:05 AM    First Gram stain result: gram negative bacilli    BioFire BCID result: Klebsiella pneumoniae no resistance genes detected    BioFire BCID and gram stain congruent? Yes    Suspected source? Urine    Patient chart has been reviewed for signs/symptoms of infection: Yes  BP (!) 107/59   Pulse (!) 101   Temp 98.8 °F (37.1 °C) (Oral)   Resp 22   Ht 1.676 m (5' 6\")   Wt 96.5 kg (212 lb 11.9 oz)   LMP  (LMP Unknown)   SpO2 98%   BMI 34.34 kg/m²   Lab Results   Component Value Date    WBC 32.1 (HH) 03/06/2024     Allergies reviewed  Levofloxacin, Morphine, and Bactrim [sulfamethoxazole-trimethoprim]    Renal function reviewed  Estimated Creatinine Clearance: 29 mL/min (A) (based on SCr of 1.7 mg/dL (H)).    Current antibiotic regimen: Zosyn    Intervention needed: No    Individual contacted: Grabiel GALO    Recommendations: Continue Zosyn and await final C&S    Recommendations accepted? Yes    MIO TOMLINSON [facility]  3/6/2024 8:05 AM    
Pt admitted to  3B 34 .   Complaints: 34 safety brochure discussed with patient.  Bed alarm on. Call light in reach.  The best day to schedule a follow up Dr appointment is:  {Time; days of week:30300} {AM/PM:9121531690}  Explained patients right to have family, representative or physician notified of their admission.  Patient has {requested/declined:[id number]} for physician to be notified.  Patient has {requested/declined:[id number]} for family/representative to be notified. All questions answered with no further questions at this time.       
Pt admitted to  3B 34 via bed with complaint of abdominal pain. Patient is belly breather, denies pain at this time. On oxygen  at 1l/min via nasal cannula. No IV infusing into the Right  AC. IV site is free of s/s of infection or infiltration. Vital signs obtained. Assessment and data collection initiated. Two nurse skin assessment performed by Jose Alejandro GALO and Selina GALO. Oriented to room. Policies and procedures for 3B explained. RN discussed hourly rounding with patient addressing 5 P's. Fall prevention and safety brochure discussed with patient.  Bed alarm on. Call light in reach.  The best day to schedule a follow up Dr appointment is:  Monday a.m.    
[facility]  SPEECH THERAPY  STRZ CCU-STEPDOWN 3B  Clinical Swallow Evaluation      SLP Individual Minutes  Time In: 1051  Time Out: 1104  Minutes: 13  Timed Code Treatment Minutes: 0 Minutes       Date: 3/6/2024  Patient Name: Consuelo Raygoza      CSN: [id number]   : 1940  (83 y.o.)  Gender: female   Referring Physician:  Luanne Mercado MD     Diagnosis: Sepsis (HCC)    History of Present Illness/Injury: HPI:  Consuelo Raygoza is a 83 y.o., , female who  has a past medical history of Cancer (HCC), History of blood transfusion, Hyperlipidemia, Hypertension, and Type II or unspecified type diabetes mellitus without mention of complication, not stated as uncontrolled. that is hospitalized for septic secondary to complicated obstruction nephrolithiasis. Patient reports new onset abdominal pain, nausea, vomiting for 1 day.  Reports chronic back pain.  Denies any fever, chills, constipation, and diarrhea, hematuria, GI upset.  Patient with described 10 out of 10.  Improved with pain medication and IV Dilaudid which pain reduce to 5/10. Denies any chest pain, chest pressure, neck pain.     ED work-up: Blood pressure elevated, tachycardia, tachypnea.  BMP showed hyperglycemia lactic acid elevated.  Troponin elevated, EKG shows sinus tachycardia no ST segment changes.  CBC within normal limits.  UA showed multiple bacteria with leukocytosis.  CT abdominal pelvic show obstructive nephrolithiasis in the middle to distal left ureter resulting in mild left hydronephrosis and hydroureter.  Urology has been contacted by ED provider  Past Medical History:   Diagnosis Date    Cancer (HCC)     breast    History of blood transfusion     Hyperlipidemia     Hypertension     Type II or unspecified type diabetes mellitus without mention of complication, not stated as uncontrolled      ST ordered to evaluate swallowing and further develop plan of care.     SUBJECTIVE:  CLOVER Fierro approved ST evaluation.  
[facility]  SPEECH THERAPY  STRZ CCU-STEPDOWN 3B  Speech - Language - Cognitive Evaluation    SLP Individual Minutes  Time In: 1403  Time Out: 1428  Minutes: 25  Timed Code Treatment Minutes: 0 Minutes       Date: 3/7/2024  Patient Name: Consuelo Raygoza      CSN: [id number]   : 1940  (83 y.o.)  Gender: female   Gender: female   Referring Physician:  Luanne Mercado MD     Diagnosis: Sepsis (HCC)     History of Present Illness/Injury: HPI:  Consuelo Raygoza is a 83 y.o., , female who  has a past medical history of Cancer (HCC), History of blood transfusion, Hyperlipidemia, Hypertension, and Type II or unspecified type diabetes mellitus without mention of complication, not stated as uncontrolled. that is hospitalized for septic secondary to complicated obstruction nephrolithiasis. Patient reports new onset abdominal pain, nausea, vomiting for 1 day.  Reports chronic back pain.  Denies any fever, chills, constipation, and diarrhea, hematuria, GI upset.  Patient with described 10 out of 10.  Improved with pain medication and IV Dilaudid which pain reduce to 5/10. Denies any chest pain, chest pressure, neck pain.     ED work-up: Blood pressure elevated, tachycardia, tachypnea.  BMP showed hyperglycemia lactic acid elevated.  Troponin elevated, EKG shows sinus tachycardia no ST segment changes.  CBC within normal limits.  UA showed multiple bacteria with leukocytosis.  CT abdominal pelvic show obstructive nephrolithiasis in the middle to distal left ureter resulting in mild left hydronephrosis and hydroureter.  Urology has been contacted by ED provider        Past Medical History:   Diagnosis Date    Cancer (HCC)     breast    History of blood transfusion     Hyperlipidemia     Hypertension     Type II or unspecified type diabetes mellitus without mention of complication, not stated as uncontrolled        Pain: No pain reported.    Subjective:  Visit approved by CLOVER Vazquez. Patient seen in 
everything, I don't do much\".      PAIN: 0/10:      Vitals: Vitals not assessed per clinical judgement, see nursing flowsheet    COGNITION: Decreased Problem Solving and Decreased Safety Awareness    ADL:   Grooming: Minimal Assistance.  Opening toothbrush packaging patient stating \"I don't want to use all my energy messing with packaging\".  Patient standing at sink to brush teeth  Lower Extremity Dressing: Maximum Assistance.  Doff/roxanne brief/incontinence pad  Toileting: Minimal Assistance.  Clothing management  Toilet Transfer: Minimal Assistance. In order to ascend, standard toilet/GB .    BALANCE:  Sitting Balance:  Stand By Assistance.    Standing Balance: Stand By Assistance. RW, no LOB     TRANSFERS:  Sit to Stand:  Stand By Assistance, X 1, with increased time for completion, cues for hand placement, with verbal cues.    Stand to Sit: Contact Guard Assistance, X 1, with increased time for completion, cues for hand placement, with verbal cues.      FUNCTIONAL MOBILITY:  Assistive Device: Rolling Walker  Assist Level:  Stand By Assistance, X 1, with verbal cues , and with increased time for completion.   Distance: To and from bathroom  Forward flex posture, no LOB     ADDITIONAL ACTIVITIES:    AM-PAC Inpatient Daily Activity Raw Score: 15  AM-PAC Inpatient ADL T-Scale Score : 34.69  ADL Inpatient CMS 0-100% Score: 56.46    ASSESSMENT:     Activity Tolerance:  Patient tolerance of  treatment: Good treatment tolerance      Discharge Recommendations: Subacute/skilled nursing facility, 24 hour assistance or supervision, and Home with Home Health OT  Equipment Recommendations: Equipment Needed: Yes  Mobility Devices: ADL Assistive Devices  Plan: Times Per Week: 5x  Current Treatment Recommendations: Strengthening, Balance training, Functional mobility training, Endurance training, Safety education & training, Patient/Caregiver education & training, Equipment evaluation, education, & procurement, Self-Care / 
strategies to maximize safety for PO intake:  -Full upright positioning  -Small bite/sip  -Alternating solids/liquids  -Slow rate of intake    Recommend continuation of regular diet with thin liquids. No further ST services are warranted at this time r/t dysphagia management given baseline status of swallow function likely achieved; please re-consult should further needs be identified.      SHORT TERM GOAL #2:  Goal 2: Patient to complete MBS if clinically indicated. GOAL MET, NO NEW GOAL  INTERVENTIONS: Not clinically indicated, see STG1.     Long-Term Goals:  No LTG established given short ELOS       Functional Oral Intake Scale: Total Oral Intake: 7.  Total oral intake with no restrictions    EDUCATION:  Learner: Patient and Significant Other  Education:  Reviewed diet and strategies, Reviewed signs, symptoms and risks of aspiration, and Reviewed recommendations for follow-up  Evaluation of Education: Verbalizes understanding and Demonstrates without assistance    ASSESSMENT/PLAN:  Activity Tolerance:  Patient tolerance of  treatment: good.      Assessment/Plan: Patient discharged from Speech Therapy at this time due to baseline status achieved.  Discharge Disposition: home with spouse.  Continued Speech Therapy Services recommended: [facility].         Peri Zabala M.A., CCC-SLP [id number]      
15  AM-PAC Inpatient ADL T-Scale Score : 34.69  ADL Inpatient CMS 0-100% Score: 56.46    Modified Dundy Scale:  Not Applicable    ASSESSMENT:     Activity Tolerance:  Patient tolerance of  treatment: Fair treatment tolerance, Limited by pain, Limited by fatigue, Reduced activity pace, and Need for increased rest breaks       Discharge Recommendations: 24 hour assistance or supervision, Home with assistance of aide, and Home with Home Health OT  Equipment Recommendations: Equipment Needed: Yes  Mobility Devices: ADL Assistive Devices  Plan: Times Per Week: 5x  Current Treatment Recommendations: Strengthening, Balance training, Functional mobility training, Endurance training, Safety education & training, Patient/Caregiver education & training, Equipment evaluation, education, & procurement, Self-Care / ADL    Education:  Learners: Patient  Role of OT, Plan of Care, ADL's, Importance of Increasing Activity, and transfer training    Goals  Short Term Goals  Time Frame for Short Term Goals: until discharge  Short Term Goal 1: Patient will safely complete various functional transfers with SBA to improve ease in toileting.  Short Term Goal 2: Patient will complete LB dressing with MIN A and AE prn to improve ease in dressing.  Short Term Goal 3: Patient will tolerate 5-7 minutes standing with SBA to complete grooming.  Short Term Goal 4: Patient will improve UE strength through independence in HEP to improve ease in transfers and ADL engagement.  Short Term Goal 5: Patient will verbalize at least 3 energy conservation techniques and how to implement into daily routine to improve ease in BADL.  Long Term Goals  Time Frame for Long Term Goals : None due to ELOS.    Following session, patient left in safe position with all fall risk precautions in place.        
Gait, Verbal Exercise Instruction, Health Promotion and Wellness Education    Goals:  Patient Goals : to go home  Short Term Goals  Time Frame for Short Term Goals: by discharge  Short Term Goal 1: Pt to transfer supine <--> sit S to enable pt to get in/out of bed.  Short Term Goal 2: Pt to transfer sit <--> stand S for increased functional mobility.  Short Term Goal 3: Pt to ambulate >50 feet with RW SBA for household ambulation.  Long Term Goals  Time Frame for Long Term Goals : NA due to short length of stay.    Following session, patient left in safe position with all fall risk precautions in place.    
06:36 AM    [facility] 40 03/07/2024 06:36 AM         Koko Lin PA-C  03/07/24 8:15 AM  Urology     
expected duration of plan of care.  If no long-term goals established, a short length of stay is anticipated.    Goals:  Patient goals : Return home with spouse  Short Term Goals  Time Frame for Short Term Goals: until discharge  Short Term Goal 1: Patient will safely complete various functional transfers with SBA to improve ease in toileting.  Short Term Goal 2: Patient will complete LB dressing with MIN A and AE prn to improve ease in dressing.  Short Term Goal 3: Patient will tolerate 5-7 minutes standing with SBA to complete grooming.  Short Term Goal 4: Patient will improve UE strength through independence in HEP to improve ease in transfers and ADL engagement.  Short Term Goal 5: Patient will verbalize at least 3 energy conservation techniques and how to implement into daily routine to improve ease in BADL.  Long Term Goals  Time Frame for Long Term Goals : None due to ELOS.    AM-PAC Inpatient Daily Activity Raw Score: 13  AM-PAC Inpatient ADL T-Scale Score : 32.03    Following session, patient left in safe position with all fall risk precautions in place.              
transfer supine <--> sit S to enable pt to get in/out of bed.  Short Term Goal 2: Pt to transfer sit <--> stand S for increased functional mobility.  Short Term Goal 3: Pt to ambulate >50 feet with RW SBA for household ambulation.  Long Term Goals  Time Frame for Long Term Goals : NA due to short length of stay.    Following session, patient left in safe position with all fall risk precautions in place.    
contain minor errors which are inherent in voice recognition technology.** Final report electronically signed by Dr Tatyana Montiel on 3/5/2024 3:04 PM          Electronically signed by Luanne Mercado MD 3/6/2024   
created using voice recognition software.  It may contain minor errors which are inherent in voice recognition technology.** Final report electronically signed by Dr Tatyana Montiel on 3/5/2024 3:04 PM          Electronically signed by Luanne Mercado MD 3/10/2024

## 2024-03-11 NOTE — DISCHARGE INSTRUCTIONS
Please check BP daily at least 2-3 times. Keep a log of BP/ date/ time of the day and take your log to your PCP office visit. Please get labs done in 1-2 weeks after discharge due to changes in your medications to assess your kidney function.

## 2024-03-11 NOTE — DISCHARGE SUMMARY
taking on: March 12, 2024            CONTINUE taking these medications      atorvastatin 20 MG tablet  Commonly known as: LIPITOR     bumetanide 1 MG tablet  Commonly known as: BUMEX  Take 1 tablet by mouth daily     clopidogrel 75 MG tablet  Commonly known as: PLAVIX     doxepin 75 MG capsule  Commonly known as: SINEQUAN     insulin  UNIT/ML injection vial  Commonly known as: HumuLIN N;NovoLIN N     losartan 50 MG tablet  Commonly known as: COZAAR     metFORMIN 850 MG tablet  Commonly known as: GLUCOPHAGE     omeprazole 20 MG tablet  Commonly known as: PRILOSEC OTC     sildenafil 50 MG tablet  Commonly known as: VIAGRA     vitamin D 25 MCG (1000 UT) Caps            STOP taking these medications      IBUPROFEN PO     metoprolol tartrate 50 MG tablet  Commonly known as: LOPRESSOR               Where to Get Your Medications        These medications were sent to [facility] - [place], [place] - [address] -  [phone] - F [phone]  [address] [address]      Phone: [phone]   amLODIPine 10 MG tablet  amoxicillin-clavulanate 875-125 MG per tablet  bumetanide 1 MG tablet  busPIRone 5 MG tablet  metoprolol succinate 50 MG extended release tablet  QUEtiapine 25 MG tablet  tamsulosin 0.4 MG capsule         Discharge Medications:-      Medication List        START taking these medications      amLODIPine 10 MG tablet  Commonly known as: NORVASC  Take 1 tablet by mouth daily  Start taking on: March 12, 2024     amoxicillin-clavulanate 875-125 MG per tablet  Commonly known as: AUGMENTIN  Take 1 tablet by mouth 2 times daily for 10 days     busPIRone 5 MG tablet  Commonly known as: BUSPAR  Take 1 tablet by mouth 2 times daily     metoprolol succinate 50 MG extended release tablet  Commonly known as: TOPROL XL  Take 1 tablet by mouth daily  Start taking on: March 12, 2024     QUEtiapine 25 MG tablet  Commonly known as: SEROQUEL  Take 1 tablet by mouth nightly

## 2024-03-11 NOTE — PROCEDURES
Bladder scan was completed by J Leopold at 1500. The residual amount of 23 ml was tesulted to harvey GALO

## 2024-03-11 NOTE — PLAN OF CARE
Problem: Respiratory - Adult  Goal: Clear lung sounds  Description: Clear lung sounds  Outcome: Progressing   Continue breathing medication to improve aeration of lungs. Patient mutually agreed on goals.

## 2024-03-11 NOTE — ADT AUTH CERT
Patient Demographics            Name    Patient ID    SSN    Gender Identity    Birth Date      Consuelo Raygoza [id number]  Female [birth year] (83 yrs)           Address    Phone    Email    Employer            [address] [place] [id number] [phone] (M)   [phone] (H)   [phone] (OTHER PHONE) [email or web address]            County    Race    Occupation    Emp Status            -- White (non-) -- Retired            Reg Status    PCP    Date Last Verified    Next Review Date            Verified Eddi Rangel MD   [phone] 03/05/24 04/04/24            Admission Date    Discharge Date    Admitting Provider                  03/05/24 -- Luanne Mercado MD             Marital Status    Uatsdin                         Bahai              Emergency Contact 1    Emergency Contact 2      Dr Marcell Snell (C)   [place]   [phone] (H) Marcell Raygoza (2)   [address] [place] [id number]   [place]   [phone] (H)           Physician Progress Notes Notes from 03/08/24 through 03/11/24          Progress Notes by Luanne Mercado MD at 3/10/2024  5:00 PM      Author: Luanne Mercado MD Service: Internal Medicine Author Type: Physician   Filed: 3/10/2024  5:17 PM Date of Service: 3/10/2024  5:00 PM Status: Addendum   : Luanne Mercado MD (Physician)   Related Notes: Original Note by Luanne Mercado MD (Physician) filed at 3/10/2024  5:17 PM               Expand All Collapse All                                                            untitled image    Hospitalist Progress Note              Patient:  Consuelo Raygoza                       Unit/Bed:3B-34/034-A    YOB: 1940    MRN: [id number]            Acct: [id number]     PCP: Eddi Rangel MD    Date of Admission: 3/5/2024         Assessment/Plan:    Elderly female admitted for sepsis due to UTI secondary to obstructive uropathy with evidence of

## 2024-03-11 NOTE — RT PROTOCOL NOTE
RT Inhaler-Nebulizer Bronchodilator Protocol Note    There is a bronchodilator order in the chart from a provider indicating to follow the RT Bronchodilator Protocol and there is an “Initiate RT Inhaler-Nebulizer Bronchodilator Protocol” order as well (see protocol at bottom of note).    CXR Findings:  XR CHEST PORTABLE    Result Date: 3/10/2024  Stable chest x-ray, no interval change since recent CTA chest dated 3/5/2024.. **This report has been created using voice recognition software. It may contain minor errors which are inherent in voice recognition technology.** Final report electronically signed by DR SARAH REINA on 3/10/2024 8:35 AM      The findings from the last RT Protocol Assessment were as follows:   History Pulmonary Disease: Chronic pulmonary disease  Respiratory Pattern: Regular pattern and RR 12-20 bpm  Breath Sounds: Slightly diminished and/or crackles  Cough: Strong, spontaneous, non-productive  Indication for Bronchodilator Therapy: Decreased or absent breath sounds, On home bronchodilators  Bronchodilator Assessment Score: 4    Aerosolized bronchodilator medication orders have been revised according to the RT Inhaler-Nebulizer Bronchodilator Protocol below.    Respiratory Therapist to perform RT Therapy Protocol Assessment initially then follow the protocol.  Repeat RT Therapy Protocol Assessment PRN for score 0-3 or on second treatment, BID, and PRN for scores above 3.    No Indications - adjust the frequency to every 6 hours PRN wheezing or bronchospasm, if no treatments needed after 48 hours then discontinue using Per Protocol order mode.     If indication present, adjust the RT bronchodilator orders based on the Bronchodilator Assessment Score as indicated below.  Use Inhaler orders unless patient has one or more of the following: on home nebulizer, not able to hold breath for 10 seconds, is not alert and oriented, cannot activate and use MDI correctly, or respiratory rate 25 breaths per 
Frequency of every 4 hours PRN for wheezing or increased work of breathing using Per Protocol order mode.        4-6 - enter or revise RT Bronchodilator order(s) to two equivalent RT bronchodilator orders with one order with BID Frequency and one order with Frequency of every 4 hours PRN wheezing or increased work of breathing using Per Protocol order mode.        7-10 - enter or revise RT Bronchodilator order(s) to two equivalent RT bronchodilator orders with one order with TID Frequency and one order with Frequency of every 4 hours PRN wheezing or increased work of breathing using Per Protocol order mode.       11-13 - enter or revise RT Bronchodilator order(s) to one equivalent RT bronchodilator order with QID Frequency and an Albuterol order with Frequency of every 4 hours PRN wheezing or increased work of breathing using Per Protocol order mode.      Greater than 13 - enter or revise RT Bronchodilator order(s) to one equivalent RT bronchodilator order with every 4 hours Frequency and an Albuterol order with Frequency of every 2 hours PRN wheezing or increased work of breathing using Per Protocol order mode.     RT to enter RT Home Evaluation for COPD & MDI Assessment order using Per Protocol order mode.    Electronically signed by Julienne Peace RCP on 3/7/2024 at 6:36 PM  
or increased work of breathing using Per Protocol order mode.        4-6 - enter or revise RT Bronchodilator order(s) to two equivalent RT bronchodilator orders with one order with BID Frequency and one order with Frequency of every 4 hours PRN wheezing or increased work of breathing using Per Protocol order mode.        7-10 - enter or revise RT Bronchodilator order(s) to two equivalent RT bronchodilator orders with one order with TID Frequency and one order with Frequency of every 4 hours PRN wheezing or increased work of breathing using Per Protocol order mode.       11-13 - enter or revise RT Bronchodilator order(s) to one equivalent RT bronchodilator order with QID Frequency and an Albuterol order with Frequency of every 4 hours PRN wheezing or increased work of breathing using Per Protocol order mode.      Greater than 13 - enter or revise RT Bronchodilator order(s) to one equivalent RT bronchodilator order with every 4 hours Frequency and an Albuterol order with Frequency of every 2 hours PRN wheezing or increased work of breathing using Per Protocol order mode.     RT to enter RT Home Evaluation for COPD & MDI Assessment order using Per Protocol order mode.    Electronically signed by Julienne Peace RCP on 3/7/2024 at 11:02 AM

## 2024-03-12 ENCOUNTER — TELEPHONE (OUTPATIENT)
Dept: UROLOGY | Age: 84
End: 2024-03-12

## 2024-03-12 NOTE — TELEPHONE ENCOUNTER
Patient to be scheduled for a Cystoscopy, left ureteroscopy, laser lithotripsy, basket retrieval of stone fragments, and  left ureteral stent exchange with Dr. Maksim Noriega. We are asking for clearance and direction on holding Plavix.

## 2024-03-12 NOTE — TELEPHONE ENCOUNTER
Abnormal  ___CBS     WNL  ABN____________________  ___BMP/BUN/CR    WNL  ABN____________________  ___K+      WNL  ABN____________________  ___UA      WNL  ABN____________________  ___CXR     WNL  ABN____________________  ___EKG     WNL  ABN____________________  ___MRSA     WNL  ABN____________________  VIII.  ___Acceptable risk for surgery ___Risk Unacceptable-Communication to Follow  Comments:_____________________________________________________  ________________________________________________________________________  Physician ___________________________  Date:_______________  Physician Printed Name:  _________________________    Fax  [phone]

## 2024-03-12 NOTE — TELEPHONE ENCOUNTER
Pt has not seen Dr. King since 10-20-22 and no follow ups with Dr. King.    Looks like pt has seen Dr. Vazquez recently 6-13-23 for cardiology.    George Vazquez MD   [address] [place], [place] [address]   Phone: [phone]   Fax: [phone]

## 2024-03-13 LAB
BACTERIA BLD AEROBE CULT: NORMAL
BACTERIA BLD AEROBE CULT: NORMAL

## 2024-03-15 ENCOUNTER — TELEPHONE (OUTPATIENT)
Dept: UROLOGY | Age: 84
End: 2024-03-15

## 2024-03-15 DIAGNOSIS — Z01.818 PRE-OP TESTING: ICD-10-CM

## 2024-03-15 DIAGNOSIS — N20.1 LEFT URETERAL CALCULUS: Primary | ICD-10-CM

## 2024-03-15 NOTE — TELEPHONE ENCOUNTER
Patient is scheduled for surgery with  on 4/2/2024. Surgery consent to be done on arrival. Dr. HELTON to clear. Patient to do pre op URINE CULTURE ON 3/18/2024; ORDERS IN [facility]; PATIENT VOICED UNDERSTANDING. Surgery instructions mailed to the patient.     Patient informed an adult over the age of 18 must be with them at the time of surgery and upon discharge

## 2024-03-15 NOTE — TELEPHONE ENCOUNTER
DO NOT TAKE  FISH OIL, MOBIC, IBUPROFEN, MOTRIN-LIKE DRUGS AND ANY MULTIVITAMINS OR OVER THE COUNTER SUPPLEMENTS 14 DAYS PRIOR TO SURGERY.    HOLD PLAVIX 5 DAYS PRIOR TO SURGERY  HOLD METFORMIN 2 DAYS PRIOR TO SURGERY  HOLD INSULIN THE MORNING OF SURGERY    MUST HAVE AN ADULT OVER THE AGE OF 18 WITH YOU AT THE TIME OF THE DISCHARGE         Consuelo LAM Jaret 1940     Surgical Physician: Dr. Noriega      You have been scheduled for the procedure marked below:      Surgery: Cystoscopy, left ureteroscopy, laser lithotripsy, basket retrieval of stone fragments, and  left ureteral stent exchange         Date:  4/2/2024      Anesthesia:  General     Place of Service: [facility] Second Floor Same Day Surgery         Arrive to same day surgery at:  11:00am  (Surgery time is subject to change)      INSTRUCTIONS AS MARKED BELOW:    1.  DO NOT eat or drink anything after midnight before surgery.  2.  We prefer you shower or bathe with an antibacterial soap (Dial) the morning of surgery.  3  Please bring a current medication list, photo ID and insurance card(s) with you  4. Okay to take Tylenol  5. Take blood pressure or heart medication as directed, if taken in the morning take with a small sip of water  6.The office will call you in 1-2 days after your procedure to schedule a follow up.          Date: 3/15/2024

## 2024-03-15 NOTE — TELEPHONE ENCOUNTER
SURGERY SCHEDULING FORM   [place] [address]      Phone *[phone]   Surgical Scheduling Direct Line Phone *[phone] Fax *[phone]      Consuelo Raygoza 1940 female    [address]  [facility] [address]  Marital Status:          Home Phone: [phone]      Cell Phone:    Telephone Information:   Mobile [phone]          Surgeon: Dr. Noriega       Surgery Date: 4/2/2024       Time: 1:00pm    Procedure: Cystoscopy, left ureteroscopy, laser lithotripsy, basket retrieval of stone fragments, and  left ureteral stent exchange    Diagnosis: kidney stone     Important Medical History:  In Epic    Special Inst/Equip:     CPT Codes:    98874  Latex Allergy: No     Cardiac Device:  No    Anesthesia:  General          Admission Type:  Same Day                        Admit Prior to Day of Surgery: No    Case Location:  Main OR            Preadmission Testing:  Phone Call          PAT Date and Time:______________________________________________________    PAT Confirmation #: ______________________________________________________    Post Op Visit: ___________________________________________________________    Need Preop Clearance: Yes    Does Patient have Cardiologist/physician?     Dr. Blackwood    Surgery Confirmation #: __________________________________________________    : ________________________   Date: __________________________     Insurance Company Name: [employer]

## 2024-03-19 ENCOUNTER — NURSE ONLY (OUTPATIENT)
Dept: LAB | Age: 84
End: 2024-03-19

## 2024-03-19 ENCOUNTER — PREP FOR PROCEDURE (OUTPATIENT)
Dept: UROLOGY | Age: 84
End: 2024-03-19

## 2024-03-19 LAB
BACTERIA URNS QL MICRO: ABNORMAL /HPF
BILIRUB UR QL STRIP.AUTO: NEGATIVE
CASTS #/AREA URNS LPF: ABNORMAL /LPF
CASTS 2: ABNORMAL /LPF
CHARACTER UR: ABNORMAL
COLOR: YELLOW
CRYSTALS URNS MICRO: ABNORMAL
EPITHELIAL CELLS, UA: ABNORMAL /HPF
GLUCOSE UR QL STRIP.AUTO: NEGATIVE MG/DL
HGB UR QL STRIP.AUTO: ABNORMAL
KETONES UR QL STRIP.AUTO: ABNORMAL
MISCELLANEOUS 2: ABNORMAL
NITRITE UR QL STRIP: NEGATIVE
PH UR STRIP.AUTO: 5.5 [PH] (ref 5–9)
PROT UR STRIP.AUTO-MCNC: 300 MG/DL
RBC URINE: > 200 /HPF
RENAL EPI CELLS #/AREA URNS HPF: ABNORMAL /[HPF]
SP GR UR REFRACT.AUTO: 1.02 (ref 1–1.03)
UROBILINOGEN, URINE: 0.2 EU/DL (ref 0–1)
WBC #/AREA URNS HPF: > 200 /HPF
WBC #/AREA URNS HPF: ABNORMAL /[HPF]
YEAST LIKE FUNGI URNS QL MICRO: ABNORMAL

## 2024-03-20 LAB
BACTERIA UR CULT: ABNORMAL
ORGANISM: ABNORMAL

## 2024-03-22 ENCOUNTER — TELEPHONE (OUTPATIENT)
Dept: UROLOGY | Age: 84
End: 2024-03-22

## 2024-03-22 RX ORDER — FLUCONAZOLE 200 MG/1
200 TABLET ORAL 2 TIMES DAILY
Qty: 14 TABLET | Refills: 0 | Status: SHIPPED | OUTPATIENT
Start: 2024-03-22 | End: 2024-03-29

## 2024-03-22 NOTE — TELEPHONE ENCOUNTER
Please review urine culture on 3/19/2024. Surgery with Dr. Noriega on 4/2/2024 for a Cystoscopy, left ureteroscopy, laser lithotripsy, basket retrieval of stone fragments, and  left ureteral stent placement  Thanks.

## 2024-03-24 RX ORDER — SODIUM CHLORIDE 0.9 % (FLUSH) 0.9 %
5-40 SYRINGE (ML) INJECTION PRN
Status: CANCELLED | OUTPATIENT
Start: 2024-03-24

## 2024-03-24 RX ORDER — SODIUM CHLORIDE 0.9 % (FLUSH) 0.9 %
5-40 SYRINGE (ML) INJECTION EVERY 12 HOURS SCHEDULED
Status: CANCELLED | OUTPATIENT
Start: 2024-03-24

## 2024-03-24 RX ORDER — SODIUM CHLORIDE 9 MG/ML
INJECTION, SOLUTION INTRAVENOUS PRN
Status: CANCELLED | OUTPATIENT
Start: 2024-03-24

## 2024-03-28 NOTE — PROGRESS NOTES
PAT call attempted, patient unavailable, left message to please call us back at your earliest convenience; [phone]

## 2024-04-01 NOTE — PROGRESS NOTES
Follow all instructions given by your physician  Do not eat or drink anything after midnight prior to surgery(includes water, chewing gum, mints and ice chips)  Sips of water am of surgery with allowed medications  Do not use chewing tobacco after midnight prior to surgery  May brush teeth do not swallow water  Bring insurance info and photo ID  Bring pertinent paperwork with you from Doctor or surgeons's office  Wear clean comfortable, loose-fitting clothing  No make-up, nail polish, jewelry, piercings, or contact lenses to be worn day of surgery  Case for glasses.  Shower the night before and the morning of surgery with cleansing soap provided or a liquid antibacterial soap, dry with new fresh clean towel after each shower, no lotions, creams or powder.  Clean sheets and pillowcase on bed night before surgery  Bring medications in original bottles, Bring rescue inhalers with you    Our pharmacy has a Meds to Beds program where they will deliver any new prescriptions you may have to your room before you leave. Our Pharmacy will clear it through your insurance; for example (same co pay). This enables you to take your new RX as soon as you need when you get home and avoids stop/wait delays on the way home.  Please have a form of payment with you and have someone designated as your Pharmacy contact with their phone # as you may not feel well or still be under the influence of anesthesia.    Please refer to the SSI-Surgical Site Infection Flyer you hopefully received in the mail-together we can prevent infections; signs and symptoms reviewed.  When discharged be sure you understand how to care for your wound and that you have the Dr./office phone # to call if you have any concerns or questions about your wound.     needed at discharge and someone over 18 to stay with you for 24 hours overnight (surgery may be cancelled if you don't have this)  Report to [facility] on 2nd floor  Call [phone] for any questions

## 2024-04-02 ENCOUNTER — ANESTHESIA (OUTPATIENT)
Dept: OPERATING ROOM | Age: 84
End: 2024-04-02
Payer: MEDICARE

## 2024-04-02 ENCOUNTER — ANESTHESIA EVENT (OUTPATIENT)
Dept: OPERATING ROOM | Age: 84
End: 2024-04-02
Payer: MEDICARE

## 2024-04-02 ENCOUNTER — HOSPITAL ENCOUNTER (OUTPATIENT)
Age: 84
Setting detail: OUTPATIENT SURGERY
Discharge: HOME | End: 2024-04-02
Attending: UROLOGY | Admitting: UROLOGY
Payer: MEDICARE

## 2024-04-02 VITALS
DIASTOLIC BLOOD PRESSURE: 89 MMHG | SYSTOLIC BLOOD PRESSURE: 141 MMHG | HEART RATE: 95 BPM | WEIGHT: 215 LBS | TEMPERATURE: 97 F | HEIGHT: 66 IN | RESPIRATION RATE: 18 BRPM | OXYGEN SATURATION: 96 % | BODY MASS INDEX: 34.55 KG/M2

## 2024-04-02 DIAGNOSIS — G89.18 POSTOPERATIVE PAIN: Primary | ICD-10-CM

## 2024-04-02 LAB
GLUCOSE BLD STRIP.AUTO-MCNC: 269 MG/DL (ref 70–108)
INR PPP: 1.09 (ref 0.85–1.13)

## 2024-04-02 PROCEDURE — 2580000003 HC RX 258: Performed by: UROLOGY

## 2024-04-02 PROCEDURE — 7100000011 HC PHASE II RECOVERY - ADDTL 15 MIN: Performed by: UROLOGY

## 2024-04-02 PROCEDURE — 3600000003 HC SURGERY LEVEL 3 BASE: Performed by: UROLOGY

## 2024-04-02 PROCEDURE — 85610 PROTHROMBIN TIME: CPT

## 2024-04-02 PROCEDURE — 7100000001 HC PACU RECOVERY - ADDTL 15 MIN: Performed by: UROLOGY

## 2024-04-02 PROCEDURE — 82948 REAGENT STRIP/BLOOD GLUCOSE: CPT

## 2024-04-02 PROCEDURE — C1758 CATHETER, URETERAL: HCPCS | Performed by: UROLOGY

## 2024-04-02 PROCEDURE — 2709999900 HC NON-CHARGEABLE SUPPLY: Performed by: UROLOGY

## 2024-04-02 PROCEDURE — 87081 CULTURE SCREEN ONLY: CPT

## 2024-04-02 PROCEDURE — C1769 GUIDE WIRE: HCPCS | Performed by: UROLOGY

## 2024-04-02 PROCEDURE — C1747 HC ENDOSCOPE, SINGLE, URINARY TRACT: HCPCS | Performed by: UROLOGY

## 2024-04-02 PROCEDURE — 6370000000 HC RX 637 (ALT 250 FOR IP): Performed by: STUDENT IN AN ORGANIZED HEALTH CARE EDUCATION/TRAINING PROGRAM

## 2024-04-02 PROCEDURE — 3700000000 HC ANESTHESIA ATTENDED CARE: Performed by: UROLOGY

## 2024-04-02 PROCEDURE — C2617 STENT, NON-COR, TEM W/O DEL: HCPCS | Performed by: UROLOGY

## 2024-04-02 PROCEDURE — 2720000010 HC SURG SUPPLY STERILE: Performed by: UROLOGY

## 2024-04-02 PROCEDURE — 6360000002 HC RX W HCPCS: Performed by: NURSE ANESTHETIST, CERTIFIED REGISTERED

## 2024-04-02 PROCEDURE — 3600000013 HC SURGERY LEVEL 3 ADDTL 15MIN: Performed by: UROLOGY

## 2024-04-02 PROCEDURE — 6360000002 HC RX W HCPCS: Performed by: UROLOGY

## 2024-04-02 PROCEDURE — 7100000010 HC PHASE II RECOVERY - FIRST 15 MIN: Performed by: UROLOGY

## 2024-04-02 PROCEDURE — 7100000000 HC PACU RECOVERY - FIRST 15 MIN: Performed by: UROLOGY

## 2024-04-02 PROCEDURE — 3700000001 HC ADD 15 MINUTES (ANESTHESIA): Performed by: UROLOGY

## 2024-04-02 PROCEDURE — 36415 COLL VENOUS BLD VENIPUNCTURE: CPT

## 2024-04-02 DEVICE — URETERAL STENT
Type: IMPLANTABLE DEVICE | Status: FUNCTIONAL
Brand: PERCUFLEX™ PLUS

## 2024-04-02 RX ORDER — FENTANYL CITRATE 50 UG/ML
50 INJECTION, SOLUTION INTRAMUSCULAR; INTRAVENOUS EVERY 5 MIN PRN
Status: CANCELLED | OUTPATIENT
Start: 2024-04-02

## 2024-04-02 RX ORDER — SODIUM CHLORIDE 9 MG/ML
INJECTION, SOLUTION INTRAVENOUS PRN
Status: DISCONTINUED | OUTPATIENT
Start: 2024-04-02 | End: 2024-04-02 | Stop reason: HOSPADM

## 2024-04-02 RX ORDER — SODIUM CHLORIDE 0.9 % (FLUSH) 0.9 %
5-40 SYRINGE (ML) INJECTION PRN
Status: CANCELLED | OUTPATIENT
Start: 2024-04-02

## 2024-04-02 RX ORDER — FENTANYL CITRATE 50 UG/ML
INJECTION, SOLUTION INTRAMUSCULAR; INTRAVENOUS PRN
Status: DISCONTINUED | OUTPATIENT
Start: 2024-04-02 | End: 2024-04-02 | Stop reason: SDUPTHER

## 2024-04-02 RX ORDER — ONDANSETRON 2 MG/ML
INJECTION INTRAMUSCULAR; INTRAVENOUS PRN
Status: DISCONTINUED | OUTPATIENT
Start: 2024-04-02 | End: 2024-04-02 | Stop reason: SDUPTHER

## 2024-04-02 RX ORDER — NALOXONE HYDROCHLORIDE 0.4 MG/ML
INJECTION, SOLUTION INTRAMUSCULAR; INTRAVENOUS; SUBCUTANEOUS PRN
Status: CANCELLED | OUTPATIENT
Start: 2024-04-02

## 2024-04-02 RX ORDER — LIDOCAINE HCL/PF 100 MG/5ML
SYRINGE (ML) INJECTION PRN
Status: DISCONTINUED | OUTPATIENT
Start: 2024-04-02 | End: 2024-04-02 | Stop reason: SDUPTHER

## 2024-04-02 RX ORDER — HYDROCODONE BITARTRATE AND ACETAMINOPHEN 5; 325 MG/1; MG/1
1 TABLET ORAL EVERY 6 HOURS PRN
Qty: 12 TABLET | Refills: 0 | Status: SHIPPED | OUTPATIENT
Start: 2024-04-02 | End: 2024-04-07

## 2024-04-02 RX ORDER — PROPOFOL 10 MG/ML
INJECTION, EMULSION INTRAVENOUS PRN
Status: DISCONTINUED | OUTPATIENT
Start: 2024-04-02 | End: 2024-04-02 | Stop reason: SDUPTHER

## 2024-04-02 RX ORDER — SODIUM CHLORIDE 0.9 % (FLUSH) 0.9 %
5-40 SYRINGE (ML) INJECTION PRN
Status: DISCONTINUED | OUTPATIENT
Start: 2024-04-02 | End: 2024-04-02 | Stop reason: HOSPADM

## 2024-04-02 RX ORDER — SODIUM CHLORIDE 0.9 % (FLUSH) 0.9 %
5-40 SYRINGE (ML) INJECTION EVERY 12 HOURS SCHEDULED
Status: DISCONTINUED | OUTPATIENT
Start: 2024-04-02 | End: 2024-04-02 | Stop reason: HOSPADM

## 2024-04-02 RX ORDER — SODIUM CHLORIDE 0.9 % (FLUSH) 0.9 %
5-40 SYRINGE (ML) INJECTION EVERY 12 HOURS SCHEDULED
Status: CANCELLED | OUTPATIENT
Start: 2024-04-02

## 2024-04-02 RX ORDER — DEXAMETHASONE SODIUM PHOSPHATE 10 MG/ML
INJECTION, EMULSION INTRAMUSCULAR; INTRAVENOUS PRN
Status: DISCONTINUED | OUTPATIENT
Start: 2024-04-02 | End: 2024-04-02 | Stop reason: SDUPTHER

## 2024-04-02 RX ORDER — PHENAZOPYRIDINE HYDROCHLORIDE 200 MG/1
200 TABLET, FILM COATED ORAL 3 TIMES DAILY PRN
Qty: 9 TABLET | Refills: 1 | Status: SHIPPED | OUTPATIENT
Start: 2024-04-02 | End: 2024-04-05

## 2024-04-02 RX ORDER — SODIUM CHLORIDE 9 MG/ML
INJECTION, SOLUTION INTRAVENOUS PRN
Status: CANCELLED | OUTPATIENT
Start: 2024-04-02

## 2024-04-02 RX ORDER — ONDANSETRON 2 MG/ML
4 INJECTION INTRAMUSCULAR; INTRAVENOUS
Status: CANCELLED | OUTPATIENT
Start: 2024-04-02 | End: 2024-04-03

## 2024-04-02 RX ADMIN — FENTANYL CITRATE 50 MCG: 50 INJECTION, SOLUTION INTRAMUSCULAR; INTRAVENOUS at 13:55

## 2024-04-02 RX ADMIN — SODIUM CHLORIDE: 9 INJECTION, SOLUTION INTRAVENOUS at 11:53

## 2024-04-02 RX ADMIN — WATER 2000 MG: 1 INJECTION INTRAMUSCULAR; INTRAVENOUS; SUBCUTANEOUS at 14:04

## 2024-04-02 RX ADMIN — Medication 100 MG: at 13:55

## 2024-04-02 RX ADMIN — Medication 6 UNITS: at 12:24

## 2024-04-02 RX ADMIN — PROPOFOL 200 MG: 10 INJECTION, EMULSION INTRAVENOUS at 13:55

## 2024-04-02 RX ADMIN — ONDANSETRON 4 MG: 2 INJECTION INTRAMUSCULAR; INTRAVENOUS at 14:08

## 2024-04-02 RX ADMIN — DEXAMETHASONE SODIUM PHOSPHATE 10 MG: 10 INJECTION, EMULSION INTRAMUSCULAR; INTRAVENOUS at 14:01

## 2024-04-02 ASSESSMENT — PAIN - FUNCTIONAL ASSESSMENT
PAIN_FUNCTIONAL_ASSESSMENT: 0-10
PAIN_FUNCTIONAL_ASSESSMENT: NONE - DENIES PAIN
PAIN_FUNCTIONAL_ASSESSMENT: PREVENTS OR INTERFERES SOME ACTIVE ACTIVITIES AND ADLS

## 2024-04-02 ASSESSMENT — PAIN DESCRIPTION - DESCRIPTORS: DESCRIPTORS: ACHING;DULL

## 2024-04-02 NOTE — H&P
SHAILA HERRERA MD  History and Physical    Patient:  Consuelo Raygoza  MRN: [id number]  YOB: 1940    HISTORY OF PRESENT ILLNESS:     The patient is a 83 y.o. female who presents with kidney stones. Here for procedure.    Patient's old records, notes and chart reviewed and summarized above.     SHAILA HERRERA MD independently reviewed the images and verified the radiology reports from:    CTA CHEST W WO CONTRAST    Result Date: 3/5/2024  Exam: CTA Chest with IV contrast. Procedure: Coronal and sagittal MIP reformats were performed Comparison: None Clinical history: Sinus tachycardia, rule out PE, sepsis Findings: No pulmonary emboli. No thoracic aortic aneurysm or dissection. No hilar or mediastinal adenopathy. No pericardial fluid collection. No pneumothorax. No pleural fluid collection. No pneumonia. Mild bibasilar dependent atelectasis. Visualized liver and spleen do not demonstrate any acute process No thoracic spine compression fractures     Impression: 1. No pulmonary emboli. 2. No thoracic aortic aneurysm or dissection 3. No pneumonia This document has been electronically signed by: Alka Yoon MD on 03/05/2024 08:51 PM All CTs at this facility use dose modulation techniques and iterative reconstructions, and/or weight-based dosing when appropriate to reduce radiation to a low as reasonably achievable. 3D Post-processing was performed on this study.    Vascular duplex lower extremity venous bilateral    Result Date: 3/5/2024  Exam: Bilateral lower extremity venous Doppler ultrasound. Comparison: None Findings: Bilateral lower extremity venous Doppler color and spectral examination was performed. Selected images from the ultrasound are provided for interpretation. Right lower extremity: Normal flow, augmentation and compression. No intraluminal thrombus is identified. Left lower extremity: Normal flow, augmentation and compression. No intraluminal thrombus is identified.     1. No acute DVT.    QUEtiapine (SEROQUEL) 25 MG tablet Take 1 tablet by mouth nightly 3/11/24 4/10/24  Luanne Mercado MD   metoprolol succinate (TOPROL XL) 50 MG extended release tablet Take 1 tablet by mouth daily 3/12/24 4/11/24  Luanne Mercado MD   amLODIPine (NORVASC) 10 MG tablet Take 1 tablet by mouth daily 3/12/24 4/11/24  Luanne Mercado MD   bumetanide (BUMEX) 1 MG tablet Take 1 tablet by mouth daily 3/11/24 4/10/24  Luanne Mercado MD   tamsulosin (FLOMAX) 0.4 MG capsule Take 1 capsule by mouth daily 3/12/24 4/11/24  Luanne Mercado MD   sildenafil (VIAGRA) 50 MG tablet Take 1 tablet by mouth daily    Angel Reno MD   vitamin D 25 MCG (1000 UT) CAPS Take 1 capsule by mouth daily    Angel Reno MD   clopidogrel (PLAVIX) 75 MG tablet Take 1 tablet by mouth daily    Angel Reno MD   metFORMIN (GLUCOPHAGE) 850 MG tablet Take 1 tablet by mouth See Admin Instructions 400 mg in the AM and 850 mg in the PM    Angel Reno MD   doxepin (SINEQUAN) 75 MG capsule Take 100 mg by mouth nightly    Angel Reno MD   omeprazole (PRILOSEC OTC) 20 MG tablet Take 1 tablet by mouth daily    Angel Reno MD   insulin NPH (HUMULIN N;NOVOLIN N) 100 UNIT/ML injection vial Inject into the skin nightly 5-10 units    Angel Reno MD   losartan (COZAAR) 50 MG tablet Take 1 tablet by mouth nightly    Angel Reno MD   atorvastatin (LIPITOR) 20 MG tablet Take 1 tablet by mouth nightly    Angel Reno MD       Allergies:  Levofloxacin, Morphine, and Bactrim [sulfamethoxazole-trimethoprim]    Social History:    Social History     Socioeconomic History    Marital status:      Spouse name: Marcell Raygoza    Number of children: 1    Years of education: 16    Highest education level: Not on file   Occupational History    Not on file   Tobacco Use    Smoking status: Former     Current packs/day: 0.00     Types: Cigarettes     Start date: 5/10/1952     Quit date: 5/10/1982     Pelvic ring fracture, open, initial encounter ([facility])    Sepsis (HCC)    Urinary tract obstruction due to kidney stone    Leg swelling    MERE (acute kidney injury) (HCC)       Plan:     Consent obtained; left urs w hll in OR today    SHAILA HERRERA MD  8:56 AM 4/2/2024

## 2024-04-02 NOTE — BRIEF OP NOTE
Brief Postoperative Note      Patient: Consuelo Raygoza  YOB: 1940  MRN: [id number]    Date of Procedure: 4/2/2024    Pre-Op Diagnosis Codes:     * Kidney stone [N20.0]    Post-Op Diagnosis: Same       Procedure(s):  Cystoscopy, Left Ureteroscopy Laser Lithotripsy and Left Ureteral Stent Exchange    Surgeon(s):  Maksim Noriega MD    Assistant:  * No surgical staff found *    Anesthesia: General    Estimated Blood Loss (mL): Minimal    Complications: None    Specimens:   * No specimens in log *    Implants:  Implant Name Type Inv. Item Serial No.  Lot No. LRB No. Used Action   STENT URET 6FR L26CM HYDR+ PGTL TAPR TIP GRAD BLDR MRK LO - [id number]  STENT URET 6FR L26CM HYDR+ PGTL TAPR TIP GRAD BLDR MRK LO  [employer]- [id number] Left 1 Implanted         Drains:   [REMOVED] External Urinary Catheter (Removed)   Site Assessment Clean,dry & intact 03/10/24 0800   Placement Replaced 03/06/24 0642   Catheter Care Catheter/Wick replaced 03/08/24 2106   Perineal Care Yes 03/08/24 2106   Suction 40 mmgHg continuous 03/06/24 0642   Urine Color Ara 03/06/24 0642   Urine Appearance Cloudy 03/06/24 0642   Urine Odor Malodorous 03/05/24 2030   Output (mL) 50 mL 03/11/24 0243       Findings: stone treated. Pull stent five days. Lyndsey check 2 weeks      Electronically signed by MAKSIM NORIEGA MD on 4/2/2024 at 3:30 PM

## 2024-04-02 NOTE — PROGRESS NOTES

## 2024-04-02 NOTE — DISCHARGE INSTRUCTIONS
Pt should Pull stent in 5 days. There may be some pain associated with the stent removal, which is usually self-limiting.  We suggest using the pain medication prescribed for you and a nonsteroidal anti-inflammatory such as Ibuprofen, if you are able to take this medication, to control symptoms.  Please stay hydrated. Please call with questions.        Pt ok to discharge home in good condition  No heavy lifting, >10 lbs for today  Pt should avoid strenuous activity for today  Pt should walk moderately at home  Pt ok to shower   Pt may resume diet as tolerated  Pt should take Rx as directed  No driving while on narcotics  Please call attending physician or hospital  with questions  Call or Present to ED if fever (> 101F), intractable nausea vomiting or pain.  Rx in chart    Pt should follow up with SHAILA HERRERA MD, in 4 weeks, call to confirm appointment

## 2024-04-02 NOTE — PROGRESS NOTES
Patient oriented to Same Day department and admitted to Same Day Surgery room 19.   Patient verbalized approval for first name, last initial with physician name on unit whiteboard.     Plan of care reviewed with patient.   Patient room whiteboard filled out and discussed with patient and responsible adult.   Patient and responsible adult offered Same Day Welcome Packet to review.    Call light in reach.   Bed in lowest position, locked, with one bed rail up.   SCDs and warming blanket in place.  Appropriate arm bands on patient.   Bathroom offered.   All questions and concerns of patient addressed.        Meds to Beds:   Patient informed of [facility] Meds to Beds program during admission. Patient is agreeable to program.   Contact information for the pharmacy and the Meds to Beds program:   Name: Daniel   Relationship to patient:spouse/significant other   Phone number: [phone]

## 2024-04-02 NOTE — ANESTHESIA PRE PROCEDURE
mouth nightly    ProviderAngel MD   atorvastatin (LIPITOR) 20 MG tablet Take 1 tablet by mouth nightly    Provider, MD Angel       Current medications:    Current Facility-Administered Medications   Medication Dose Route Frequency Provider Last Rate Last Admin   • sodium chloride flush 0.9 % injection 5-40 mL  5-40 mL IntraVENous 2 times per day Maksim Noriega MD       • sodium chloride flush 0.9 % injection 5-40 mL  5-40 mL IntraVENous PRN Maksim Noriega MD       • 0.9 % sodium chloride infusion   IntraVENous PRN Maksim Noriega MD       • ceFAZolin (ANCEF) 2,000 mg in sterile water 20 mL IV syringe  2,000 mg IntraVENous On Call to OR Maksim Noriega MD           Allergies:    Allergies   Allergen Reactions   • Levofloxacin Other (See Comments)     blisters   • Morphine Itching     Sick to stomach   • Bactrim [Sulfamethoxazole-Trimethoprim] Other (See Comments)     \"Body shut down\"       Problem List:    Patient Active Problem List   Diagnosis Code   • Pneumonia J18.9   • Type 2 diabetes mellitus without complication, with long-term current use of insulin ([facility]) E11.9, Z79.4   • Primary hypertension I10   • Influenza B J10.1   • Generalized weakness R53.1   • Fatigue R53.83   • SIRS (systemic inflammatory response syndrome) ([facility]) R65.10   • Acute kidney injury ([facility]) N17.9   • Headache R51.9   • Neck pain M54.2   • Metabolic acidosis E87.20   • Hyperkalemia E87.5   • Candidal intertrigo B37.2   • Left-sided weakness R53.1   • History of breast cancer Z85.3   • Boil, groin L02.224   • Obesity (BMI 30-39.9) E66.9   • Dizziness R42   • Thrombocytopenia ([facility]) D69.6   • Normocytic anemia D64.9   • Leukopenia D72.819   • Elevated AST (SGOT) R74.01   • Rash R21   • Incoordination R27.9   • Reactive airway disease with wheezing J45.909   • Muscle weakness of proximal extremity M62.81   • Debility R53.81   • Panic disorder without agoraphobia with severe panic attacks F41.0   • Acute intractable headache R51.9   • Ataxic gait

## 2024-04-02 NOTE — PROGRESS NOTES
1422: pt arrives to pacu, respirations unlabored on room air. Pt awake and oriented. States no pain just burning when she voids.   1434: pt awake and tsalking. Pt voided. External catheter placed.   1442: pt resting with eyes closed. No signs of distress noted.  1452: pt meets criteria for discharge from pacu at this time. pt transported to [facility] in stable condition   1457: Report given to Perri GALO

## 2024-04-02 NOTE — PROGRESS NOTES
Pt returned to [facility] room 19. Vitals and assessment as charted. 0.9 infusing, @450ml to count from PACU. Pt has crackers and water. Family at the bedside. Pt and family verbalized understanding of discharge criteria and call light use. Call light in reach.

## 2024-04-06 LAB — VRE SPEC QL CULT: NORMAL

## 2024-04-08 NOTE — OP NOTE
Maksim Noriega MD.  Urologic Surgery      [facility]    DATE: 4/2/2024  Patient:  Consuelo Raygoza  MRN: [id number]  YOB: 1940    SURGEON: Maksim Noriega MD.    ASSISTANT: none    PREOPERATIVE DIAGNOSIS: left ureteral stone      POSTOPERATIVE DIAGNOSIS:  left ureteral stone      PROCEDURE PERFORMED: cystoscopy, left ureteroscopy left holmium laser lithotripsy  left stent exchange    ANESTHESIA: General    COMPLICATIONS: none    OR BLOOD LOSS:  Minimal    FLUIDS: Cystalloids per Anesthesia    SPECIMENS:  * No specimens in log *  none    DRAINS: 6 x 26 dbl j stent    INDICATIONS FOR PROCEDURE:  The patient is a 83 y.o. female who presents today with Kidney stone [N20.0] here for Cystoscopy, Left Ureteroscopy Laser Lithotripsy and Left Ureteral Stent Exchange. After risks, benefits and alternatives of the procedure were discussed with the patient, the patient elected to proceed.     DETAILS OF PROCEDURE:  After informed consent was obtained in the preoperative area, the patient was taken back to the operating room and transferred to the operating table in supine position.  Anesthesia was induced and antibiotics were given.  The patient was placed in modified dorsal lithotomy position and sterilely prepped and draped in a standard fashion.  A timeout occurred.  Two patient identifiers were used.     We entered the urethra with a 22 Telugu scope.     We focused our attention on the left ureteral orifice.     The stent was seen emanating from the ureteral orifice. It was grasped using a foreign body grasper and brought to the ureteral meatus. A wire was placed through the stent into the kidney under fluoroscopic guidance. The stent was removed, and a dual lumen catheter was used to place a second wire into the kidney under fluoroscopic guidance..    The flexible ureteroscope was assembled, place over one Glidewire, and advanced into the ureter carefully under fluoroscopy. The second wire remained

## 2024-04-18 ENCOUNTER — OFFICE VISIT (OUTPATIENT)
Dept: UROLOGY | Age: 84
End: 2024-04-18
Payer: MEDICARE

## 2024-04-18 VITALS — HEIGHT: 66 IN | BODY MASS INDEX: 34.55 KG/M2 | WEIGHT: 215 LBS | RESPIRATION RATE: 16 BRPM

## 2024-04-18 DIAGNOSIS — N20.1 LEFT URETERAL CALCULUS: Primary | ICD-10-CM

## 2024-04-18 DIAGNOSIS — R30.0 DYSURIA: ICD-10-CM

## 2024-04-18 DIAGNOSIS — N39.46 MIXED STRESS AND URGE URINARY INCONTINENCE: ICD-10-CM

## 2024-04-18 LAB
BILIRUBIN URINE: NEGATIVE
BLOOD URINE, POC: ABNORMAL
CHARACTER, URINE: ABNORMAL
COLOR, URINE: ABNORMAL
GLUCOSE URINE: NEGATIVE MG/DL
KETONES, URINE: NEGATIVE
LEUKOCYTE CLUMPS, URINE: ABNORMAL
NITRITE, URINE: POSITIVE
PH, URINE: 5.5 (ref 5–9)
PROTEIN, URINE: ABNORMAL MG/DL
SPECIFIC GRAVITY, URINE: >= 1.03 (ref 1–1.03)
UROBILINOGEN, URINE: 0.2 EU/DL (ref 0–1)

## 2024-04-18 PROCEDURE — 99214 OFFICE O/P EST MOD 30 MIN: CPT

## 2024-04-18 PROCEDURE — 1124F ACP DISCUSS-NO DSCNMKR DOCD: CPT

## 2024-04-18 PROCEDURE — 81003 URINALYSIS AUTO W/O SCOPE: CPT

## 2024-04-18 RX ORDER — NITROFURANTOIN 25; 75 MG/1; MG/1
100 CAPSULE ORAL 2 TIMES DAILY
Qty: 28 CAPSULE | Refills: 0 | Status: SHIPPED | OUTPATIENT
Start: 2024-04-18 | End: 2024-05-02

## 2024-04-18 RX ORDER — FLUCONAZOLE 150 MG/1
150 TABLET ORAL ONCE
Qty: 1 TABLET | Refills: 0 | Status: SHIPPED | OUTPATIENT
Start: 2024-04-18 | End: 2024-04-18

## 2024-04-18 NOTE — PROGRESS NOTES
[facility] [place] [facility]  [facility]  [address]  [address]  [facility] [address]  Dept: [phone]  Loc: [phone]  Visit Date: 4/18/2024      HPI:   Consuelo Raygoza is a 83 y.o. female with past medical history as listed below who presents today in follow-up for post-op kidney stone check.      S/P Cystoscopy, Left Ureteroscopy, Laser Lithotripsy, Left Ureteral Stent Exchange by Dr. Noriega on 04/02/24 to address her 4 mm obstructing left ureteral calculus. Stone treated without difficulty and left-sided ureteral stent pulled by the patient as instructed on POD 5. Doing well at this time. She denies flank pain, difficulty with urination, or gross hematuria.    At baseline the patient is floridly incontinent. Ongoing for years and is not overly bothersome to her. She states that she is going through 2 pads per day. \"It has just become part of my routine.\"     Labs   UA:  Results for POC orders placed in visit on 04/18/24   POCT Urinalysis No Micro (Auto)   Result Value Ref Range    Glucose, Ur Negative NEGATIVE mg/dl    Bilirubin Urine Negative     Ketones, Urine Negative NEGATIVE    Specific Gravity, Urine >= 1.030 1.002 - 1.030    Blood, UA POC Small (A) NEGATIVE    pH, Urine 5.50 5.0 - 9.0    Protein, Urine Trace (A) NEGATIVE mg/dl    Urobilinogen, Urine 0.20 0.0 - 1.0 eu/dl    Nitrite, Urine Positive (A) NEGATIVE    Leukocyte Clumps, Urine Large (A) NEGATIVE    Color, Urine Dark yellow (A) YELLOW-STRAW    Character, Urine Cloudy (A) [id number]       Current Outpatient Medications   Medication Sig Dispense Refill    metoprolol succinate (TOPROL XL) 50 MG extended release tablet Take 1 tablet by mouth daily 30 tablet 0    amLODIPine (NORVASC) 10 MG tablet Take 1 tablet by mouth daily 30 tablet 0    bumetanide (BUMEX) 1 MG tablet Take 1 tablet by mouth daily 30 tablet 0    tamsulosin (FLOMAX) 0.4 MG capsule Take 1 capsule by mouth daily 30 capsule 0    sildenafil (VIAGRA)

## 2024-04-21 ENCOUNTER — TELEPHONE (OUTPATIENT)
Dept: UROLOGY | Age: 84
End: 2024-04-21

## 2024-04-21 DIAGNOSIS — R31.9 HEMATURIA, UNSPECIFIED TYPE: Primary | ICD-10-CM

## 2024-04-21 RX ORDER — DOXYCYCLINE HYCLATE 100 MG
100 TABLET ORAL 2 TIMES DAILY
Qty: 20 TABLET | Refills: 0 | Status: SHIPPED | OUTPATIENT
Start: 2024-04-21 | End: 2024-05-01

## 2024-04-21 NOTE — TELEPHONE ENCOUNTER
Urine culture remarkable for growth.  Is patient symptomatic?  Does she have flank pain, urinary frequency, urinary urgency, hematuria.    Limited in terms of antibiotic options given patient's allergies to Cipro, Levaquin, Bactrim.  Unable to send Macrobid given patient's kidney function.  Will send doxycycline    Please repeat urine micro and culture following completion of therapy given presence of blood in the urine and difficulty with selection of appropriate antibiotic      Call or present to the ED with flank pain.     The patient should go to the ED should they develop fever, chills, nausea, vomiting, chest pain, SOB, calf pain, feelings of incomplete emptying, or should they otherwise feel they need evaluated\

## 2024-04-22 ENCOUNTER — TELEPHONE (OUTPATIENT)
Dept: UROLOGY | Age: 84
End: 2024-04-22

## 2024-04-22 LAB
BACTERIA UR CULT: ABNORMAL
BACTERIA UR CULT: ABNORMAL
ORGANISM: ABNORMAL
ORGANISM: ABNORMAL

## 2024-04-22 RX ORDER — GRANULES FOR ORAL 3 G/1
3 POWDER ORAL ONCE
Qty: 1 EACH | Refills: 0 | Status: SHIPPED | OUTPATIENT
Start: 2024-04-22 | End: 2024-04-22

## 2024-04-22 NOTE — TELEPHONE ENCOUNTER
Yes. Stop Macrobid. CI based on kidney function.     The patient should go to the ED should they develop fever, chills, nausea, vomiting, chest pain, SOB, calf pain, feelings of incomplete emptying, or should they otherwise feel they need evaluated

## 2024-04-22 NOTE — TELEPHONE ENCOUNTER
Patient  advised fosfomycin was also sent to the pharmacy to take with doxycycline. He will use a good rx coupon if needed.

## 2024-04-22 NOTE — TELEPHONE ENCOUNTER
Culture bounced back. Start fosfomycin in addition to doxycyline.     The patient should go to the ED should they develop fever, chills, nausea, vomiting, chest pain, SOB, calf pain, feelings of incomplete emptying, or should they otherwise feel they need evaluated    F/u as scheduled

## 2024-04-22 NOTE — TELEPHONE ENCOUNTER
Patient has been on macrobid since 04/18/2024. Patient  states she does not have symptoms and will have the urine repeated after completing antibiotics.    Order sent via mail.    Do you want the macrobid stopped and started on the doxycycline?

## 2024-04-22 NOTE — TELEPHONE ENCOUNTER
Patient  advised to stop the macrobid and start doxycycline. He voiced understanding and will have her call with questions, concerns, or flank pain.

## 2024-05-06 ENCOUNTER — NURSE ONLY (OUTPATIENT)
Dept: LAB | Age: 84
End: 2024-05-06

## 2024-05-06 DIAGNOSIS — R31.9 HEMATURIA, UNSPECIFIED TYPE: ICD-10-CM

## 2024-05-06 LAB
BACTERIA: ABNORMAL
BILIRUB UR QL STRIP: NEGATIVE
CASTS #/AREA URNS LPF: ABNORMAL /LPF
CASTS #/AREA URNS LPF: ABNORMAL /LPF
CHARACTER UR: CLEAR
CHARCOAL URNS QL MICRO: ABNORMAL
COLOR UR: YELLOW
CRYSTALS URNS QL MICRO: ABNORMAL
EPITHELIAL CELLS, UA: ABNORMAL /HPF
GLUCOSE UR QL STRIP.AUTO: NEGATIVE MG/DL
HGB UR QL STRIP.AUTO: NEGATIVE
KETONES UR QL STRIP.AUTO: NEGATIVE
LEUKOCYTE ESTERASE UR QL STRIP.AUTO: NEGATIVE
NITRITE UR QL STRIP.AUTO: NEGATIVE
PH UR STRIP.AUTO: 5.5 [PH] (ref 5–9)
PROT UR STRIP.AUTO-MCNC: ABNORMAL MG/DL
RBC #/AREA URNS HPF: ABNORMAL /HPF
RENAL EPI CELLS #/AREA URNS HPF: ABNORMAL /[HPF]
SPECIFIC GRAVITY UA: 1.02 (ref 1–1.03)
UROBILINOGEN, URINE: 0.2 EU/DL (ref 0–1)
WBC #/AREA URNS HPF: ABNORMAL /HPF
YEAST LIKE FUNGI URNS QL MICRO: ABNORMAL

## 2024-05-07 ENCOUNTER — TELEPHONE (OUTPATIENT)
Dept: UROLOGY | Age: 84
End: 2024-05-07

## 2024-05-07 LAB
BACTERIA UR CULT: ABNORMAL
ORGANISM: ABNORMAL

## 2024-05-07 NOTE — TELEPHONE ENCOUNTER
Microhematuria resolved per most recent urine micro.    Call with flank pain or concerns of UTI    F/u as scheduled    The patient should go to the ED should they develop fever, chills, nausea, vomiting, chest pain, SOB, calf pain, feelings of incomplete emptying, or should they otherwise feel they need evaluated

## 2024-09-30 ENCOUNTER — LAB (OUTPATIENT)
Dept: LAB | Age: 84
End: 2024-09-30

## 2024-09-30 LAB
ALBUMIN SERPL BCG-MCNC: 3.9 G/DL (ref 3.5–5.1)
ALP SERPL-CCNC: 76 U/L (ref 38–126)
ALT SERPL W/O P-5'-P-CCNC: 16 U/L (ref 11–66)
ANION GAP SERPL CALC-SCNC: 11 MEQ/L (ref 8–16)
AST SERPL-CCNC: 19 U/L (ref 5–40)
BILIRUB SERPL-MCNC: 0.3 MG/DL (ref 0.3–1.2)
BUN SERPL-MCNC: 27 MG/DL (ref 7–22)
CALCIUM SERPL-MCNC: 9.4 MG/DL (ref 8.5–10.5)
CHLORIDE SERPL-SCNC: 105 MEQ/L (ref 98–111)
CO2 SERPL-SCNC: 25 MEQ/L (ref 23–33)
CREAT SERPL-MCNC: 1 MG/DL (ref 0.4–1.2)
DEPRECATED MEAN GLUCOSE BLD GHB EST-ACNC: 153 MG/DL (ref 70–126)
GFR SERPL CREATININE-BSD FRML MDRD: 55 ML/MIN/1.73M2
GLUCOSE SERPL-MCNC: 145 MG/DL (ref 70–108)
HBA1C MFR BLD HPLC: 7.1 % (ref 4.4–6.4)
POTASSIUM SERPL-SCNC: 4.8 MEQ/L (ref 3.5–5.2)
PROT SERPL-MCNC: 6.7 G/DL (ref 6.1–8)
SODIUM SERPL-SCNC: 141 MEQ/L (ref 135–145)

## 2025-01-02 ENCOUNTER — LAB (OUTPATIENT)
Dept: LAB | Age: 85
End: 2025-01-02

## 2025-01-02 LAB
ALBUMIN SERPL BCG-MCNC: 3.9 G/DL (ref 3.5–5.1)
ALP SERPL-CCNC: 78 U/L (ref 38–126)
ALT SERPL W/O P-5'-P-CCNC: 17 U/L (ref 11–66)
ANION GAP SERPL CALC-SCNC: 15 MEQ/L (ref 8–16)
AST SERPL-CCNC: 17 U/L (ref 5–40)
BASOPHILS ABSOLUTE: 0 THOU/MM3 (ref 0–0.1)
BASOPHILS NFR BLD AUTO: 0.5 %
BILIRUB SERPL-MCNC: 0.2 MG/DL (ref 0.3–1.2)
BUN SERPL-MCNC: 25 MG/DL (ref 7–22)
CALCIUM SERPL-MCNC: 9.6 MG/DL (ref 8.5–10.5)
CHLORIDE SERPL-SCNC: 103 MEQ/L (ref 98–111)
CHOLEST SERPL-MCNC: 113 MG/DL (ref 100–199)
CO2 SERPL-SCNC: 25 MEQ/L (ref 23–33)
CREAT SERPL-MCNC: 1.7 MG/DL (ref 0.4–1.2)
DEPRECATED MEAN GLUCOSE BLD GHB EST-ACNC: 141 MG/DL (ref 70–126)
DEPRECATED RDW RBC AUTO: 43 FL (ref 35–45)
EOSINOPHIL NFR BLD AUTO: 2.3 %
EOSINOPHILS ABSOLUTE: 0.2 THOU/MM3 (ref 0–0.4)
ERYTHROCYTE [DISTWIDTH] IN BLOOD BY AUTOMATED COUNT: 12.8 % (ref 11.5–14.5)
GFR SERPL CREATININE-BSD FRML MDRD: 29 ML/MIN/1.73M2
GLUCOSE SERPL-MCNC: 79 MG/DL (ref 70–108)
HBA1C MFR BLD HPLC: 6.7 % (ref 4.4–6.4)
HCT VFR BLD AUTO: 43.3 % (ref 37–47)
HDLC SERPL-MCNC: 34 MG/DL
HGB BLD-MCNC: 13.1 GM/DL (ref 12–16)
IMM GRANULOCYTES # BLD AUTO: 0.03 THOU/MM3 (ref 0–0.07)
IMM GRANULOCYTES NFR BLD AUTO: 0.4 %
LDLC SERPL CALC-MCNC: 52 MG/DL
LYMPHOCYTES ABSOLUTE: 2.4 THOU/MM3 (ref 1–4.8)
LYMPHOCYTES NFR BLD AUTO: 28.7 %
MCH RBC QN AUTO: 27.9 PG (ref 26–33)
MCHC RBC AUTO-ENTMCNC: 30.3 GM/DL (ref 32.2–35.5)
MCV RBC AUTO: 92.3 FL (ref 81–99)
MONOCYTES ABSOLUTE: 0.7 THOU/MM3 (ref 0.4–1.3)
MONOCYTES NFR BLD AUTO: 8 %
NEUTROPHILS ABSOLUTE: 5 THOU/MM3 (ref 1.8–7.7)
NEUTROPHILS NFR BLD AUTO: 60.1 %
NRBC BLD AUTO-RTO: 0 /100 WBC
PLATELET # BLD AUTO: 252 THOU/MM3 (ref 130–400)
PMV BLD AUTO: 11.4 FL (ref 9.4–12.4)
POTASSIUM SERPL-SCNC: 4.8 MEQ/L (ref 3.5–5.2)
PROT SERPL-MCNC: 6.9 G/DL (ref 6.1–8)
RBC # BLD AUTO: 4.69 MILL/MM3 (ref 4.2–5.4)
SODIUM SERPL-SCNC: 143 MEQ/L (ref 135–145)
TRIGL SERPL-MCNC: 133 MG/DL (ref 0–199)
WBC # BLD AUTO: 8.3 THOU/MM3 (ref 4.8–10.8)

## 2025-01-07 ENCOUNTER — LAB (OUTPATIENT)
Dept: LAB | Age: 85
End: 2025-01-07

## 2025-01-07 LAB
CREAT UR-MCNC: 125 MG/DL
MICROALBUMIN UR-MCNC: 1.51 MG/DL
MICROALBUMIN/CREAT RATIO PNL UR: 12 MG/G (ref 0–30)

## 2025-06-10 ENCOUNTER — OFFICE VISIT (OUTPATIENT)
Age: 85
End: 2025-06-10
Payer: MEDICARE

## 2025-06-10 VITALS
HEART RATE: 82 BPM | HEIGHT: 67 IN | BODY MASS INDEX: 32.96 KG/M2 | SYSTOLIC BLOOD PRESSURE: 174 MMHG | DIASTOLIC BLOOD PRESSURE: 72 MMHG | WEIGHT: 210 LBS

## 2025-06-10 DIAGNOSIS — R60.0 LEG EDEMA, RIGHT: ICD-10-CM

## 2025-06-10 DIAGNOSIS — R23.0 CYANOSIS: ICD-10-CM

## 2025-06-10 DIAGNOSIS — I73.9 PAD (PERIPHERAL ARTERY DISEASE): Primary | ICD-10-CM

## 2025-06-10 PROCEDURE — 99204 OFFICE O/P NEW MOD 45 MIN: CPT | Performed by: SURGERY

## 2025-06-10 PROCEDURE — 1159F MED LIST DOCD IN RCRD: CPT | Performed by: SURGERY

## 2025-06-10 PROCEDURE — 1160F RVW MEDS BY RX/DR IN RCRD: CPT | Performed by: SURGERY

## 2025-06-10 PROCEDURE — 1124F ACP DISCUSS-NO DSCNMKR DOCD: CPT | Performed by: SURGERY

## 2025-06-10 RX ORDER — IBUPROFEN 200 MG
200 TABLET ORAL EVERY 6 HOURS PRN
COMMUNITY

## 2025-06-10 RX ORDER — SILDENAFIL 50 MG/1
50 TABLET, FILM COATED ORAL DAILY
COMMUNITY

## 2025-06-10 ASSESSMENT — ENCOUNTER SYMPTOMS
BACK PAIN: 1
RHINORRHEA: 0
ABDOMINAL PAIN: 0
CHEST TIGHTNESS: 0
SHORTNESS OF BREATH: 0
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
## 2025-06-10 NOTE — PATIENT INSTRUCTIONS
If you receive a survey asking about your care experience, please respond. Your answers will help ensure you receive high-quality care at this office. Thank you!    Your Medical Assistant today: Anayeli CONKLIN  Thank you for coming to our office! It was a pleasure to serve you.

## 2025-06-20 ENCOUNTER — HOSPITAL ENCOUNTER (OUTPATIENT)
Dept: INTERVENTIONAL RADIOLOGY/VASCULAR | Age: 85
Discharge: HOME | End: 2025-06-22
Attending: SURGERY
Payer: MEDICARE

## 2025-06-20 DIAGNOSIS — I73.9 PAD (PERIPHERAL ARTERY DISEASE): ICD-10-CM

## 2025-06-20 DIAGNOSIS — R60.0 LEG EDEMA, RIGHT: ICD-10-CM

## 2025-06-20 PROCEDURE — 93971 EXTREMITY STUDY: CPT

## 2025-06-20 PROCEDURE — 93925 LOWER EXTREMITY STUDY: CPT

## 2025-06-30 ENCOUNTER — OFFICE VISIT (OUTPATIENT)
Age: 85
End: 2025-06-30
Payer: MEDICARE

## 2025-06-30 VITALS
HEIGHT: 66 IN | SYSTOLIC BLOOD PRESSURE: 182 MMHG | WEIGHT: 210 LBS | BODY MASS INDEX: 33.75 KG/M2 | HEART RATE: 82 BPM | DIASTOLIC BLOOD PRESSURE: 74 MMHG

## 2025-06-30 DIAGNOSIS — I73.9 PAD (PERIPHERAL ARTERY DISEASE): ICD-10-CM

## 2025-06-30 DIAGNOSIS — R23.0 CYANOSIS: ICD-10-CM

## 2025-06-30 DIAGNOSIS — M79.89 LEG SWELLING: Primary | ICD-10-CM

## 2025-06-30 PROCEDURE — 1124F ACP DISCUSS-NO DSCNMKR DOCD: CPT | Performed by: SURGERY

## 2025-06-30 PROCEDURE — 1159F MED LIST DOCD IN RCRD: CPT | Performed by: SURGERY

## 2025-06-30 PROCEDURE — 99214 OFFICE O/P EST MOD 30 MIN: CPT | Performed by: SURGERY

## 2025-06-30 RX ORDER — ASPIRIN 81 MG/1
81 TABLET ORAL DAILY
Qty: 100 TABLET | Refills: 5 | Status: SHIPPED | OUTPATIENT
Start: 2025-06-30

## 2025-06-30 ASSESSMENT — ENCOUNTER SYMPTOMS
ABDOMINAL PAIN: 0
RHINORRHEA: 0
CHEST TIGHTNESS: 0
BACK PAIN: 1
SHORTNESS OF BREATH: 0
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
## 2025-08-15 ENCOUNTER — LAB (OUTPATIENT)
Dept: LAB | Age: 85
End: 2025-08-15

## 2025-08-15 LAB
ALBUMIN SERPL BCG-MCNC: 3.8 G/DL (ref 3.4–4.9)
ALP SERPL-CCNC: 75 U/L (ref 38–126)
ALT SERPL W/O P-5'-P-CCNC: 21 U/L (ref 10–35)
ANION GAP SERPL CALC-SCNC: 13 MEQ/L (ref 8–16)
AST SERPL-CCNC: 21 U/L (ref 10–35)
BILIRUB SERPL-MCNC: 0.2 MG/DL (ref 0.3–1.2)
BUN SERPL-MCNC: 24 MG/DL (ref 8–23)
CALCIUM SERPL-MCNC: 10 MG/DL (ref 8.5–10.5)
CHLORIDE SERPL-SCNC: 104 MEQ/L (ref 98–111)
CO2 SERPL-SCNC: 26 MEQ/L (ref 22–29)
CREAT SERPL-MCNC: 1.2 MG/DL (ref 0.5–0.9)
DEPRECATED MEAN GLUCOSE BLD GHB EST-ACNC: 174 MG/DL (ref 70–126)
GFR SERPL CREATININE-BSD FRML MDRD: 44 ML/MIN/1.73M2
GLUCOSE SERPL-MCNC: 147 MG/DL (ref 74–109)
HBA1C MFR BLD HPLC: 7.8 % (ref 4–6)
POTASSIUM SERPL-SCNC: 4.6 MEQ/L (ref 3.5–5.2)
PROT SERPL-MCNC: 6.7 G/DL (ref 6.4–8.3)
SODIUM SERPL-SCNC: 143 MEQ/L (ref 135–145)

## (undated) DEVICE — STRIP,CLOSURE,WOUND,MEDI-STRIP,1/2X4: Brand: MEDLINE

## (undated) DEVICE — GUIDEWIRE URO L150CM DIA .035IN STIFF NIT HYDRPHL STR TIP

## (undated) DEVICE — SINGLE ACTION PUMPING SYSTEM

## (undated) DEVICE — DUAL LUMEN URETERAL CATHETER

## (undated) DEVICE — CYSTO: Brand: MEDLINE INDUSTRIES, INC.

## (undated) DEVICE — MASTISOL ADHESIVE LIQ 2/3ML

## (undated) DEVICE — SOLUTION IRRIG 1000ML STRL H2O USP PLAS POUR BTL

## (undated) DEVICE — SINGLE-USE DIGITAL FLEXIBLE URETEROSCOPE: Brand: LITHOVUE

## (undated) DEVICE — SOLUTION IRRIG 3000ML 0.9% SOD CHL USP UROMATIC PLAS CONT

## (undated) DEVICE — ADAPTER URO SCP UROLOK LL

## (undated) DEVICE — FLEXIVA  PULSE  AND  FLEXIVA  PULSE  TRACTIP  LASER  FIBERS  ARE  HIGH  POWER  SINGLE-USE FIBER: Brand: FLEXIVA PULSE ID